# Patient Record
Sex: MALE | Race: WHITE | NOT HISPANIC OR LATINO | Employment: OTHER | URBAN - METROPOLITAN AREA
[De-identification: names, ages, dates, MRNs, and addresses within clinical notes are randomized per-mention and may not be internally consistent; named-entity substitution may affect disease eponyms.]

---

## 2019-04-21 ENCOUNTER — ANESTHESIA EVENT (OUTPATIENT)
Dept: SURGERY | Facility: MEDICAL CENTER | Age: 49
DRG: 872 | End: 2019-04-21
Payer: COMMERCIAL

## 2019-04-21 ENCOUNTER — HOSPITAL ENCOUNTER (INPATIENT)
Facility: MEDICAL CENTER | Age: 49
LOS: 2 days | DRG: 872 | End: 2019-04-23
Attending: EMERGENCY MEDICINE | Admitting: HOSPITALIST
Payer: COMMERCIAL

## 2019-04-21 ENCOUNTER — ANESTHESIA (OUTPATIENT)
Dept: SURGERY | Facility: MEDICAL CENTER | Age: 49
DRG: 872 | End: 2019-04-21
Payer: COMMERCIAL

## 2019-04-21 ENCOUNTER — APPOINTMENT (OUTPATIENT)
Dept: RADIOLOGY | Facility: MEDICAL CENTER | Age: 49
DRG: 872 | End: 2019-04-21
Attending: INTERNAL MEDICINE
Payer: COMMERCIAL

## 2019-04-21 DIAGNOSIS — L03.113 CELLULITIS OF RIGHT UPPER EXTREMITY: ICD-10-CM

## 2019-04-21 PROBLEM — L03.90 CELLULITIS: Status: ACTIVE | Noted: 2019-04-21

## 2019-04-21 PROBLEM — F10.10 ALCOHOL ABUSE: Status: ACTIVE | Noted: 2019-04-21

## 2019-04-21 PROBLEM — A41.9 SEPSIS (HCC): Status: ACTIVE | Noted: 2019-04-21

## 2019-04-21 PROBLEM — Z72.0 TOBACCO ABUSE: Status: ACTIVE | Noted: 2019-04-21

## 2019-04-21 LAB
ANION GAP SERPL CALC-SCNC: 7 MMOL/L (ref 0–11.9)
BASOPHILS # BLD AUTO: 0.3 % (ref 0–1.8)
BASOPHILS # BLD: 0.05 K/UL (ref 0–0.12)
BUN SERPL-MCNC: 13 MG/DL (ref 8–22)
CALCIUM SERPL-MCNC: 8.8 MG/DL (ref 8.5–10.5)
CHLORIDE SERPL-SCNC: 106 MMOL/L (ref 96–112)
CO2 SERPL-SCNC: 23 MMOL/L (ref 20–33)
CREAT SERPL-MCNC: 0.74 MG/DL (ref 0.5–1.4)
CRP SERPL HS-MCNC: 4.1 MG/L (ref 0–7.5)
EOSINOPHIL # BLD AUTO: 0.13 K/UL (ref 0–0.51)
EOSINOPHIL NFR BLD: 0.8 % (ref 0–6.9)
ERYTHROCYTE [DISTWIDTH] IN BLOOD BY AUTOMATED COUNT: 40.2 FL (ref 35.9–50)
GLUCOSE SERPL-MCNC: 112 MG/DL (ref 65–99)
GRAM STN SPEC: NORMAL
HCT VFR BLD AUTO: 45.6 % (ref 42–52)
HGB BLD-MCNC: 15.6 G/DL (ref 14–18)
IMM GRANULOCYTES # BLD AUTO: 0.06 K/UL (ref 0–0.11)
IMM GRANULOCYTES NFR BLD AUTO: 0.4 % (ref 0–0.9)
LACTATE BLD-SCNC: 1.2 MMOL/L (ref 0.5–2)
LACTATE BLD-SCNC: 1.4 MMOL/L (ref 0.5–2)
LYMPHOCYTES # BLD AUTO: 2.14 K/UL (ref 1–4.8)
LYMPHOCYTES NFR BLD: 13 % (ref 22–41)
MCH RBC QN AUTO: 29.9 PG (ref 27–33)
MCHC RBC AUTO-ENTMCNC: 34.2 G/DL (ref 33.7–35.3)
MCV RBC AUTO: 87.5 FL (ref 81.4–97.8)
MONOCYTES # BLD AUTO: 0.89 K/UL (ref 0–0.85)
MONOCYTES NFR BLD AUTO: 5.4 % (ref 0–13.4)
NEUTROPHILS # BLD AUTO: 13.13 K/UL (ref 1.82–7.42)
NEUTROPHILS NFR BLD: 80.1 % (ref 44–72)
NRBC # BLD AUTO: 0 K/UL
NRBC BLD-RTO: 0 /100 WBC
PLATELET # BLD AUTO: 224 K/UL (ref 164–446)
PMV BLD AUTO: 10.5 FL (ref 9–12.9)
POTASSIUM SERPL-SCNC: 3.8 MMOL/L (ref 3.6–5.5)
PROCALCITONIN SERPL-MCNC: <0.05 NG/ML
RBC # BLD AUTO: 5.21 M/UL (ref 4.7–6.1)
SIGNIFICANT IND 70042: NORMAL
SITE SITE: NORMAL
SODIUM SERPL-SCNC: 136 MMOL/L (ref 135–145)
SOURCE SOURCE: NORMAL
WBC # BLD AUTO: 16.4 K/UL (ref 4.8–10.8)

## 2019-04-21 PROCEDURE — 87070 CULTURE OTHR SPECIMN AEROBIC: CPT

## 2019-04-21 PROCEDURE — 96365 THER/PROPH/DIAG IV INF INIT: CPT

## 2019-04-21 PROCEDURE — 87040 BLOOD CULTURE FOR BACTERIA: CPT

## 2019-04-21 PROCEDURE — 96367 TX/PROPH/DG ADDL SEQ IV INF: CPT

## 2019-04-21 PROCEDURE — 87186 SC STD MICRODIL/AGAR DIL: CPT

## 2019-04-21 PROCEDURE — 160027 HCHG SURGERY MINUTES - 1ST 30 MINS LEVEL 2: Performed by: ORTHOPAEDIC SURGERY

## 2019-04-21 PROCEDURE — 84145 PROCALCITONIN (PCT): CPT

## 2019-04-21 PROCEDURE — 73201 CT UPPER EXTREMITY W/DYE: CPT | Mod: RT

## 2019-04-21 PROCEDURE — 80048 BASIC METABOLIC PNL TOTAL CA: CPT

## 2019-04-21 PROCEDURE — 700105 HCHG RX REV CODE 258: Performed by: ANESTHESIOLOGY

## 2019-04-21 PROCEDURE — 500891 HCHG PACK, ORTHO MAJOR: Performed by: ORTHOPAEDIC SURGERY

## 2019-04-21 PROCEDURE — 700101 HCHG RX REV CODE 250: Performed by: ANESTHESIOLOGY

## 2019-04-21 PROCEDURE — 700117 HCHG RX CONTRAST REV CODE 255: Performed by: INTERNAL MEDICINE

## 2019-04-21 PROCEDURE — 87205 SMEAR GRAM STAIN: CPT

## 2019-04-21 PROCEDURE — 770006 HCHG ROOM/CARE - MED/SURG/GYN SEMI*

## 2019-04-21 PROCEDURE — 87077 CULTURE AEROBIC IDENTIFY: CPT

## 2019-04-21 PROCEDURE — 700105 HCHG RX REV CODE 258: Performed by: HOSPITALIST

## 2019-04-21 PROCEDURE — 700111 HCHG RX REV CODE 636 W/ 250 OVERRIDE (IP): Performed by: INTERNAL MEDICINE

## 2019-04-21 PROCEDURE — 700111 HCHG RX REV CODE 636 W/ 250 OVERRIDE (IP): Performed by: ANESTHESIOLOGY

## 2019-04-21 PROCEDURE — 700105 HCHG RX REV CODE 258: Performed by: INTERNAL MEDICINE

## 2019-04-21 PROCEDURE — 86141 C-REACTIVE PROTEIN HS: CPT

## 2019-04-21 PROCEDURE — 87075 CULTR BACTERIA EXCEPT BLOOD: CPT

## 2019-04-21 PROCEDURE — 99220 PR INITIAL OBSERVATION CARE,LEVL III: CPT | Performed by: HOSPITALIST

## 2019-04-21 PROCEDURE — 700111 HCHG RX REV CODE 636 W/ 250 OVERRIDE (IP): Performed by: HOSPITALIST

## 2019-04-21 PROCEDURE — 96375 TX/PRO/DX INJ NEW DRUG ADDON: CPT

## 2019-04-21 PROCEDURE — 501838 HCHG SUTURE GENERAL: Performed by: ORTHOPAEDIC SURGERY

## 2019-04-21 PROCEDURE — 0H9FXZZ DRAINAGE OF RIGHT HAND SKIN, EXTERNAL APPROACH: ICD-10-PCS | Performed by: ORTHOPAEDIC SURGERY

## 2019-04-21 PROCEDURE — A9270 NON-COVERED ITEM OR SERVICE: HCPCS | Performed by: HOSPITALIST

## 2019-04-21 PROCEDURE — 83605 ASSAY OF LACTIC ACID: CPT

## 2019-04-21 PROCEDURE — 160048 HCHG OR STATISTICAL LEVEL 1-5: Performed by: ORTHOPAEDIC SURGERY

## 2019-04-21 PROCEDURE — 160036 HCHG PACU - EA ADDL 30 MINS PHASE I: Performed by: ORTHOPAEDIC SURGERY

## 2019-04-21 PROCEDURE — 96368 THER/DIAG CONCURRENT INF: CPT

## 2019-04-21 PROCEDURE — 700111 HCHG RX REV CODE 636 W/ 250 OVERRIDE (IP): Performed by: EMERGENCY MEDICINE

## 2019-04-21 PROCEDURE — 99285 EMERGENCY DEPT VISIT HI MDM: CPT

## 2019-04-21 PROCEDURE — 160035 HCHG PACU - 1ST 60 MINS PHASE I: Performed by: ORTHOPAEDIC SURGERY

## 2019-04-21 PROCEDURE — 700101 HCHG RX REV CODE 250: Performed by: EMERGENCY MEDICINE

## 2019-04-21 PROCEDURE — 96366 THER/PROPH/DIAG IV INF ADDON: CPT

## 2019-04-21 PROCEDURE — 700102 HCHG RX REV CODE 250 W/ 637 OVERRIDE(OP): Performed by: HOSPITALIST

## 2019-04-21 PROCEDURE — 160009 HCHG ANES TIME/MIN: Performed by: ORTHOPAEDIC SURGERY

## 2019-04-21 PROCEDURE — 160002 HCHG RECOVERY MINUTES (STAT): Performed by: ORTHOPAEDIC SURGERY

## 2019-04-21 PROCEDURE — 85025 COMPLETE CBC W/AUTO DIFF WBC: CPT

## 2019-04-21 PROCEDURE — 36415 COLL VENOUS BLD VENIPUNCTURE: CPT

## 2019-04-21 PROCEDURE — 700105 HCHG RX REV CODE 258: Performed by: EMERGENCY MEDICINE

## 2019-04-21 RX ORDER — MEPERIDINE HYDROCHLORIDE 25 MG/ML
12.5 INJECTION INTRAMUSCULAR; INTRAVENOUS; SUBCUTANEOUS
Status: DISCONTINUED | OUTPATIENT
Start: 2019-04-21 | End: 2019-04-21 | Stop reason: HOSPADM

## 2019-04-21 RX ORDER — SODIUM CHLORIDE 9 MG/ML
INJECTION, SOLUTION INTRAVENOUS CONTINUOUS
Status: DISCONTINUED | OUTPATIENT
Start: 2019-04-21 | End: 2019-04-22

## 2019-04-21 RX ORDER — DIPHENHYDRAMINE HYDROCHLORIDE 50 MG/ML
12.5 INJECTION INTRAMUSCULAR; INTRAVENOUS
Status: DISCONTINUED | OUTPATIENT
Start: 2019-04-21 | End: 2019-04-21 | Stop reason: HOSPADM

## 2019-04-21 RX ORDER — SODIUM CHLORIDE, SODIUM LACTATE, POTASSIUM CHLORIDE, CALCIUM CHLORIDE 600; 310; 30; 20 MG/100ML; MG/100ML; MG/100ML; MG/100ML
INJECTION, SOLUTION INTRAVENOUS
Status: DISCONTINUED | OUTPATIENT
Start: 2019-04-21 | End: 2019-04-21 | Stop reason: SURG

## 2019-04-21 RX ORDER — OXYCODONE HYDROCHLORIDE 5 MG/1
5 TABLET ORAL
Status: DISCONTINUED | OUTPATIENT
Start: 2019-04-21 | End: 2019-04-23 | Stop reason: HOSPADM

## 2019-04-21 RX ORDER — SODIUM CHLORIDE 9 MG/ML
500 INJECTION, SOLUTION INTRAVENOUS
Status: DISCONTINUED | OUTPATIENT
Start: 2019-04-21 | End: 2019-04-23 | Stop reason: HOSPADM

## 2019-04-21 RX ORDER — CLINDAMYCIN PHOSPHATE 600 MG/50ML
600 INJECTION, SOLUTION INTRAVENOUS ONCE
Status: COMPLETED | OUTPATIENT
Start: 2019-04-21 | End: 2019-04-21

## 2019-04-21 RX ORDER — HEPARIN SODIUM 5000 [USP'U]/ML
5000 INJECTION, SOLUTION INTRAVENOUS; SUBCUTANEOUS EVERY 8 HOURS
Status: DISCONTINUED | OUTPATIENT
Start: 2019-04-21 | End: 2019-04-21

## 2019-04-21 RX ORDER — DEXAMETHASONE SODIUM PHOSPHATE 4 MG/ML
INJECTION, SOLUTION INTRA-ARTICULAR; INTRALESIONAL; INTRAMUSCULAR; INTRAVENOUS; SOFT TISSUE PRN
Status: DISCONTINUED | OUTPATIENT
Start: 2019-04-21 | End: 2019-04-21 | Stop reason: SURG

## 2019-04-21 RX ORDER — HYDROMORPHONE HYDROCHLORIDE 1 MG/ML
0.2 INJECTION, SOLUTION INTRAMUSCULAR; INTRAVENOUS; SUBCUTANEOUS
Status: DISCONTINUED | OUTPATIENT
Start: 2019-04-21 | End: 2019-04-21 | Stop reason: HOSPADM

## 2019-04-21 RX ORDER — OXYCODONE HCL 5 MG/5 ML
10 SOLUTION, ORAL ORAL
Status: DISCONTINUED | OUTPATIENT
Start: 2019-04-21 | End: 2019-04-21 | Stop reason: HOSPADM

## 2019-04-21 RX ORDER — POLYETHYLENE GLYCOL 3350 17 G/17G
1 POWDER, FOR SOLUTION ORAL
Status: DISCONTINUED | OUTPATIENT
Start: 2019-04-21 | End: 2019-04-23 | Stop reason: HOSPADM

## 2019-04-21 RX ORDER — HALOPERIDOL 5 MG/ML
1 INJECTION INTRAMUSCULAR
Status: DISCONTINUED | OUTPATIENT
Start: 2019-04-21 | End: 2019-04-21 | Stop reason: HOSPADM

## 2019-04-21 RX ORDER — HYDROMORPHONE HYDROCHLORIDE 1 MG/ML
0.4 INJECTION, SOLUTION INTRAMUSCULAR; INTRAVENOUS; SUBCUTANEOUS
Status: DISCONTINUED | OUTPATIENT
Start: 2019-04-21 | End: 2019-04-21 | Stop reason: HOSPADM

## 2019-04-21 RX ORDER — DEXMEDETOMIDINE HYDROCHLORIDE 100 UG/ML
INJECTION, SOLUTION INTRAVENOUS PRN
Status: DISCONTINUED | OUTPATIENT
Start: 2019-04-21 | End: 2019-04-21 | Stop reason: SURG

## 2019-04-21 RX ORDER — METOPROLOL TARTRATE 1 MG/ML
1 INJECTION, SOLUTION INTRAVENOUS
Status: DISCONTINUED | OUTPATIENT
Start: 2019-04-21 | End: 2019-04-21 | Stop reason: HOSPADM

## 2019-04-21 RX ORDER — ONDANSETRON 2 MG/ML
4 INJECTION INTRAMUSCULAR; INTRAVENOUS
Status: COMPLETED | OUTPATIENT
Start: 2019-04-21 | End: 2019-04-21

## 2019-04-21 RX ORDER — HYDROMORPHONE HYDROCHLORIDE 1 MG/ML
0.1 INJECTION, SOLUTION INTRAMUSCULAR; INTRAVENOUS; SUBCUTANEOUS
Status: DISCONTINUED | OUTPATIENT
Start: 2019-04-21 | End: 2019-04-21 | Stop reason: HOSPADM

## 2019-04-21 RX ORDER — MORPHINE SULFATE 4 MG/ML
4 INJECTION, SOLUTION INTRAMUSCULAR; INTRAVENOUS
Status: DISCONTINUED | OUTPATIENT
Start: 2019-04-21 | End: 2019-04-23 | Stop reason: HOSPADM

## 2019-04-21 RX ORDER — OXYCODONE HCL 5 MG/5 ML
5 SOLUTION, ORAL ORAL
Status: DISCONTINUED | OUTPATIENT
Start: 2019-04-21 | End: 2019-04-21 | Stop reason: HOSPADM

## 2019-04-21 RX ORDER — SODIUM CHLORIDE 9 MG/ML
1000 INJECTION, SOLUTION INTRAVENOUS ONCE
Status: COMPLETED | OUTPATIENT
Start: 2019-04-21 | End: 2019-04-21

## 2019-04-21 RX ORDER — OXYCODONE HYDROCHLORIDE 10 MG/1
10 TABLET ORAL
Status: DISCONTINUED | OUTPATIENT
Start: 2019-04-21 | End: 2019-04-23 | Stop reason: HOSPADM

## 2019-04-21 RX ORDER — IBUPROFEN 200 MG
200-400 TABLET ORAL 2 TIMES DAILY PRN
COMMUNITY

## 2019-04-21 RX ORDER — AMOXICILLIN 250 MG
2 CAPSULE ORAL 2 TIMES DAILY
Status: DISCONTINUED | OUTPATIENT
Start: 2019-04-21 | End: 2019-04-23 | Stop reason: HOSPADM

## 2019-04-21 RX ORDER — SODIUM CHLORIDE, SODIUM LACTATE, POTASSIUM CHLORIDE, CALCIUM CHLORIDE 600; 310; 30; 20 MG/100ML; MG/100ML; MG/100ML; MG/100ML
INJECTION, SOLUTION INTRAVENOUS CONTINUOUS
Status: DISCONTINUED | OUTPATIENT
Start: 2019-04-21 | End: 2019-04-21 | Stop reason: HOSPADM

## 2019-04-21 RX ORDER — BISACODYL 10 MG
10 SUPPOSITORY, RECTAL RECTAL
Status: DISCONTINUED | OUTPATIENT
Start: 2019-04-21 | End: 2019-04-23 | Stop reason: HOSPADM

## 2019-04-21 RX ORDER — HYDRALAZINE HYDROCHLORIDE 20 MG/ML
5 INJECTION INTRAMUSCULAR; INTRAVENOUS
Status: DISCONTINUED | OUTPATIENT
Start: 2019-04-21 | End: 2019-04-21 | Stop reason: HOSPADM

## 2019-04-21 RX ORDER — ONDANSETRON 2 MG/ML
INJECTION INTRAMUSCULAR; INTRAVENOUS PRN
Status: DISCONTINUED | OUTPATIENT
Start: 2019-04-21 | End: 2019-04-21 | Stop reason: SURG

## 2019-04-21 RX ADMIN — IOHEXOL 100 ML: 350 INJECTION, SOLUTION INTRAVENOUS at 12:30

## 2019-04-21 RX ADMIN — OXYCODONE HYDROCHLORIDE 5 MG: 5 TABLET ORAL at 17:16

## 2019-04-21 RX ADMIN — FENTANYL CITRATE 50 MCG: 50 INJECTION, SOLUTION INTRAMUSCULAR; INTRAVENOUS at 14:07

## 2019-04-21 RX ADMIN — SODIUM CHLORIDE: 9 INJECTION, SOLUTION INTRAVENOUS at 17:10

## 2019-04-21 RX ADMIN — DEXAMETHASONE SODIUM PHOSPHATE 8 MG: 4 INJECTION, SOLUTION INTRA-ARTICULAR; INTRALESIONAL; INTRAMUSCULAR; INTRAVENOUS; SOFT TISSUE at 13:59

## 2019-04-21 RX ADMIN — CLINDAMYCIN IN 5 PERCENT DEXTROSE 600 MG: 12 INJECTION, SOLUTION INTRAVENOUS at 05:04

## 2019-04-21 RX ADMIN — PROPOFOL 200 MG: 10 INJECTION, EMULSION INTRAVENOUS at 13:54

## 2019-04-21 RX ADMIN — AMPICILLIN SODIUM AND SULBACTAM SODIUM 3 G: 2; 1 INJECTION, POWDER, FOR SOLUTION INTRAMUSCULAR; INTRAVENOUS at 23:53

## 2019-04-21 RX ADMIN — SODIUM CHLORIDE, POTASSIUM CHLORIDE, SODIUM LACTATE AND CALCIUM CHLORIDE: 600; 310; 30; 20 INJECTION, SOLUTION INTRAVENOUS at 15:45

## 2019-04-21 RX ADMIN — VANCOMYCIN HYDROCHLORIDE 2100 MG: 100 INJECTION, POWDER, LYOPHILIZED, FOR SOLUTION INTRAVENOUS at 06:15

## 2019-04-21 RX ADMIN — AMPICILLIN SODIUM AND SULBACTAM SODIUM 3 G: 2; 1 INJECTION, POWDER, FOR SOLUTION INTRAMUSCULAR; INTRAVENOUS at 17:16

## 2019-04-21 RX ADMIN — VANCOMYCIN HYDROCHLORIDE 1000 MG: 100 INJECTION, POWDER, LYOPHILIZED, FOR SOLUTION INTRAVENOUS at 18:41

## 2019-04-21 RX ADMIN — LIDOCAINE HYDROCHLORIDE 80 MG: 20 INJECTION, SOLUTION INFILTRATION; PERINEURAL at 13:54

## 2019-04-21 RX ADMIN — OXYCODONE HYDROCHLORIDE 5 MG: 5 TABLET ORAL at 08:31

## 2019-04-21 RX ADMIN — FENTANYL CITRATE 50 MCG: 50 INJECTION, SOLUTION INTRAMUSCULAR; INTRAVENOUS at 14:01

## 2019-04-21 RX ADMIN — ONDANSETRON 4 MG: 2 INJECTION INTRAMUSCULAR; INTRAVENOUS at 14:06

## 2019-04-21 RX ADMIN — FENTANYL CITRATE 100 MCG: 50 INJECTION, SOLUTION INTRAMUSCULAR; INTRAVENOUS at 05:37

## 2019-04-21 RX ADMIN — ONDANSETRON 4 MG: 2 INJECTION INTRAMUSCULAR; INTRAVENOUS at 15:33

## 2019-04-21 RX ADMIN — SODIUM CHLORIDE: 9 INJECTION, SOLUTION INTRAVENOUS at 06:38

## 2019-04-21 RX ADMIN — SODIUM CHLORIDE, POTASSIUM CHLORIDE, SODIUM LACTATE AND CALCIUM CHLORIDE: 600; 310; 30; 20 INJECTION, SOLUTION INTRAVENOUS at 13:47

## 2019-04-21 RX ADMIN — SODIUM CHLORIDE 1000 ML: 9 INJECTION, SOLUTION INTRAVENOUS at 05:38

## 2019-04-21 RX ADMIN — AMPICILLIN SODIUM AND SULBACTAM SODIUM 3 G: 2; 1 INJECTION, POWDER, FOR SOLUTION INTRAMUSCULAR; INTRAVENOUS at 05:56

## 2019-04-21 RX ADMIN — DEXMEDETOMIDINE HYDROCHLORIDE 40 MCG: 100 INJECTION, SOLUTION INTRAVENOUS at 14:02

## 2019-04-21 ASSESSMENT — ENCOUNTER SYMPTOMS
SPEECH CHANGE: 0
EYE DISCHARGE: 0
ABDOMINAL PAIN: 0
CHILLS: 1
PALPITATIONS: 0
NAUSEA: 0
DEPRESSION: 0
SHORTNESS OF BREATH: 0
FOCAL WEAKNESS: 0
BLURRED VISION: 0
DIZZINESS: 0
MYALGIAS: 0
DOUBLE VISION: 0
FLANK PAIN: 0
VOMITING: 0
SENSORY CHANGE: 0
HEARTBURN: 0
FEVER: 0
BRUISES/BLEEDS EASILY: 0
COUGH: 0
HALLUCINATIONS: 0
HEMOPTYSIS: 0
TINGLING: 1
WEAKNESS: 1

## 2019-04-21 ASSESSMENT — LIFESTYLE VARIABLES
HOW MANY TIMES IN THE PAST YEAR HAVE YOU HAD 5 OR MORE DRINKS IN A DAY: 0
ON A TYPICAL DAY WHEN YOU DRINK ALCOHOL HOW MANY DRINKS DO YOU HAVE: 0
TOTAL SCORE: 0
TOTAL SCORE: 0
ALCOHOL_USE: YES
TOTAL SCORE: 0
EVER HAD A DRINK FIRST THING IN THE MORNING TO STEADY YOUR NERVES TO GET RID OF A HANGOVER: NO
CONSUMPTION TOTAL: NEGATIVE
AVERAGE NUMBER OF DAYS PER WEEK YOU HAVE A DRINK CONTAINING ALCOHOL: 0
EVER FELT BAD OR GUILTY ABOUT YOUR DRINKING: NO
SUBSTANCE_ABUSE: 0
HAVE PEOPLE ANNOYED YOU BY CRITICIZING YOUR DRINKING: NO
HAVE YOU EVER FELT YOU SHOULD CUT DOWN ON YOUR DRINKING: NO
EVER_SMOKED: YES

## 2019-04-21 ASSESSMENT — PATIENT HEALTH QUESTIONNAIRE - PHQ9
SUM OF ALL RESPONSES TO PHQ9 QUESTIONS 1 AND 2: 0
1. LITTLE INTEREST OR PLEASURE IN DOING THINGS: NOT AT ALL
2. FEELING DOWN, DEPRESSED, IRRITABLE, OR HOPELESS: NOT AT ALL

## 2019-04-21 ASSESSMENT — PAIN SCALES - GENERAL: PAIN_LEVEL: 6

## 2019-04-21 NOTE — PROGRESS NOTES
Pt was in OR. Chart reviewed. Cellulitis of hand after workplace injury, concern for abscess, going to the OR for I&D today.    Continue IV vanc and Unasyn for now. Will follow OP note and cultures    Full consult note to follow

## 2019-04-21 NOTE — H&P
Hospital Medicine History & Physical Note    Date of Service  4/21/2019    Primary Care Physician  No primary care provider on file.    Consultants  Ortho/hand    Code Status  full    Chief Complaint  Hand pain and swelling    History of Presenting Illness  49 y.o. male who presented 4/21/2019 with pmx of tobacco abuse presents with right arm pain and swelling.  This patient was actually working in his basement on a water cooler and got cut on his hand 2 days ago.  He assumed that he probably got bit by an insect earlier tonight.  And developed right arm swelling pain erythema.  No known alleviating or exacerbating factors to his symptoms.  Significant pain 10 out of 10.  No fever no chills no sweats.  Streaking erythema up into his arm.  He will be admitted to the hospital with rapidly progressing cellulitis.    Review of Systems  Review of Systems   Constitutional: Positive for chills. Negative for fever.   HENT: Negative for congestion, hearing loss and tinnitus.    Eyes: Negative for blurred vision, double vision and discharge.   Respiratory: Negative for cough, hemoptysis and shortness of breath.    Cardiovascular: Negative for chest pain, palpitations and leg swelling.   Gastrointestinal: Negative for abdominal pain, heartburn, nausea and vomiting.   Genitourinary: Negative for dysuria and flank pain.   Musculoskeletal: Negative for joint pain and myalgias.   Skin: Positive for rash.   Neurological: Positive for weakness. Negative for dizziness, sensory change, speech change and focal weakness.   Endo/Heme/Allergies: Negative for environmental allergies. Does not bruise/bleed easily.   Psychiatric/Behavioral: Negative for depression, hallucinations and substance abuse.       Past Medical History  Reviewed and not pertinent    Surgical History  Reviewed and not pertinent     Family History  Reviewed and not pertinent     Social History   reports that he has been smoking Cigarettes.  He has been smoking about  0.33 packs per day. He has never used smokeless tobacco. He reports that he drinks alcohol. He reports that he does not use drugs.    Allergies  No Known Allergies    Medications  None       Physical Exam  Temp:  [36.8 °C (98.3 °F)] 36.8 °C (98.3 °F)  Pulse:  [104] 104  Resp:  [16] 16  BP: (153)/(99) 153/99  SpO2:  [98 %] 98 %    Physical Exam   Constitutional: He is oriented to person, place, and time. He appears well-developed and well-nourished.   HENT:   Head: Normocephalic and atraumatic.   Eyes: Pupils are equal, round, and reactive to light. Conjunctivae and EOM are normal.   Neck: Normal range of motion. Neck supple. No JVD present.   Cardiovascular: Normal rate, regular rhythm, normal heart sounds and intact distal pulses.    No murmur heard.  Pulmonary/Chest: Effort normal and breath sounds normal. No respiratory distress. He exhibits no tenderness.   Abdominal: Soft. Bowel sounds are normal. He exhibits no distension. There is no tenderness.   Musculoskeletal: Normal range of motion. He exhibits edema.   Right hand erythema and swelling, pain with passive rom and decreased sensation to the tips. Streaking erythema up the arm   Neurological: He is alert and oriented to person, place, and time. No cranial nerve deficit. He exhibits normal muscle tone.   Skin: Skin is warm and dry. There is erythema.   Psychiatric: He has a normal mood and affect. His behavior is normal. Judgment and thought content normal.   Nursing note and vitals reviewed.      Laboratory:  Recent Labs      04/21/19   0443   WBC  16.4*   RBC  5.21   HEMOGLOBIN  15.6   HEMATOCRIT  45.6   MCV  87.5   MCH  29.9   MCHC  34.2   RDW  40.2   PLATELETCT  224   MPV  10.5     Recent Labs      04/21/19   0443   SODIUM  136   POTASSIUM  3.8   CHLORIDE  106   CO2  23   GLUCOSE  112*   BUN  13   CREATININE  0.74   CALCIUM  8.8     Recent Labs      04/21/19   0443   GLUCOSE  112*                 No results for input(s): TROPONINI in the last 72  hours.    Urinalysis:    No results found     Imaging:  No orders to display         Assessment/Plan:  I anticipate this patient will require at least two midnights for appropriate medical management, necessitating inpatient admission.    * Cellulitis   Assessment & Plan    IV abx broad spectrum   Rapidly progressing and significant amount of pain out or proportion to exam   NPO for now, ortho has been consulted  Pain control   Descilate therapy as appropriate     Sepsis (HCC)   Assessment & Plan    This is sepsis (without associated acute organ dysfunction).   Tachycardia, leukocytosis  Cellulitis source  IV abx   Follow cultures  Trend lactic   descilate therapy as appropriate     Alcohol abuse   Assessment & Plan    Encouraged cessation      Tobacco abuse   Assessment & Plan    Greater than 10 minutes spent with patient smoking cessation counseling, discussed cardiovascular risk factors of smoking. Nicotine patch provided         VTE prophylaxis: scd

## 2019-04-21 NOTE — ED PROVIDER NOTES
ED Provider Note    Scribed for Elsa Griffith M.D. by Lopez Scott. 4/21/2019, 4:49 AM.    Primary care provider: PCP, none noted.  Means of arrival: Walk in  History obtained from: Patient  History limited by: None    CHIEF COMPLAINT  Chief Complaint   Patient presents with   • Bug Bite     Approximately 4 hours ago, the patient noticed his right hand was hurting, he thought he was bit by a mosquito. Pt now reports his pain is a 9/10, startin in his right hand and moving up his arm. Pt also reports his fingers have also begun to get cold, and numb over the past hour.        PRADEEP Shaver is a 49 y.o. male who presents to the Emergency Department for evaluation of right hand pain onset several hours ago. The patient states that he thought he got an insect bite to the right hand about 4 hours ago. He assumed it was a mosquito, but did not see the insect. The patient then noted swelling that has begun to extend up his right arm with red streaks moving up his arm. No exacerbating or alleviating factors. The patient endorses tingling of the right thumb and index finger, but denies any fevers. He denies any history of abscesses or any allergies to antibiotics.    REVIEW OF SYSTEMS  Review of Systems   Constitutional: Negative for fever.   Musculoskeletal:        Arm pain and swelling   Neurological: Positive for tingling.   All other systems reviewed and are negative.    PAST MEDICAL HISTORY  No history of abscess.    SURGICAL HISTORY  patient denies any surgical history    SOCIAL HISTORY  Social History   Substance Use Topics   • Smoking status: Current Every Day Smoker     Packs/day: 0.33     Types: Cigarettes   • Smokeless tobacco: Never Used   • Alcohol use Yes      Comment: 1 beer daily      History   Drug Use No       FAMILY HISTORY  History reviewed. No pertinent family history.    CURRENT MEDICATIONS  Home Medications     Reviewed by Sebas Washburn R.N. (Registered Nurse) on 04/21/19 at 6193   "Med List Status: Partial   Medication Last Dose Status        Patient Jean Taking any Medications                       ALLERGIES  No Known Allergies    PHYSICAL EXAM  VITAL SIGNS: /99   Pulse (!) 104   Temp 36.8 °C (98.3 °F) (Temporal)   Resp 16   Ht 1.803 m (5' 11\")   Wt 84.1 kg (185 lb 6.5 oz)   SpO2 98%   BMI 25.86 kg/m²   Vitals reviewed by myself.  Physical Exam  Nursing note and vitals reviewed.  Constitutional: Well-developed and well-nourished. In mild distress  HENT: Head is normocephalic and atraumatic.  Eyes: extra-ocular movements intact  Cardiovascular: Tachycardic rate and regular rhythm. No murmur heard. 2+ bilateral radial pulses  Pulmonary/Chest: Breath sounds normal. No wheezes or rales.   Abdominal: Soft and non-tender. No distention.    Musculoskeletal: Patient has significant erythema to the dorsal aspect of the hand below the right thumb and index finger extending into the right thumb and index fingers and up the rest.  Patient has associated streaking erythema up the right forearm.  Patient has pain with movement of the right thumb and index finger.  Sensation is intact in all distal fingertips.    Neurological: Awake and alert  Skin: Skin is warm and dry.     DIAGNOSTIC STUDIES /  LABS  Labs Reviewed   CBC WITH DIFFERENTIAL - Abnormal; Notable for the following:        Result Value    WBC 16.4 (*)     Neutrophils-Polys 80.10 (*)     Lymphocytes 13.00 (*)     Neutrophils (Absolute) 13.13 (*)     Monos (Absolute) 0.89 (*)     All other components within normal limits   BASIC METABOLIC PANEL - Abnormal; Notable for the following:     Glucose 112 (*)     All other components within normal limits   CRP HIGH SENSITIVE (CARDIAC)   LACTIC ACID   BLOOD CULTURE    Narrative:     Per Hospital Policy: Only change Specimen Src: to \"Line\" if  specified by physician order.   BLOOD CULTURE    Narrative:     Per Hospital Policy: Only change Specimen Src: to \"Line\" if  specified by physician " order.   ESTIMATED GFR   PROCALCITONIN   CULTURE RESPIRATORY W/ GRM STN   BLOOD CULTURE   BLOOD CULTURE   URINALYSIS   LACTIC ACID     All labs reviewed by me.    REASSESSMENT  4:55 AM Patient presents to the ED with right arm pain and swelling. I informed the patient that I will contact the hand specialist and he will need to undergo blood work for evaluation. The patient will need to be started on antibiotics for treatment.    5:26 AM Dr. Peña, Hospitalist, consulted and agrees to admit the patient.    5:30 AM I reevaluated the patient and he was doing well. I informed him that he will need to be admitted. He understands and agrees to plan of care.    5:32 AM Dr. Caban, Orthopedics, consulted and agrees to evaluate the patient while patient is admitted.    HYDRATION: Based on the patient's presentation of Sepsis and Tachycardia the patient was given IV fluids. IV Hydration was used because oral hydration was not as rapid as required. Upon recheck following hydration, the patient was imporved..        COURSE & MEDICAL DECISION MAKING  Nursing notes, VS, PMSFHx reviewed in chart.    Patient is a 49-year-old male who comes in for erythema to his right hand.  Given the rapid progression over the last 4 hours of his erythema with streaking up the right arm and tingling in his right thumb and index finger I am concerned about necrotizing fasciitis versus simple cellulitis.  I elect to obtain labs and blood cultures and start patient on IV clindamycin.      Patient's initial vitals notable for slight tachycardia, patient started on antibiotics and IV fluids after which tachycardia resolved.  His pain is managed with fentanyl after which he feels improved.  Labs returned are notable for leukocytosis of 16,000.  Lactic acid and CRP are within normal limits.  Given the rapid progression of symptoms I discussed the case with on-call hand surgeon Dr. Caban who advises to continue IV antibiotics and admit the patient, and  patient will be evaluated by hand surgery who will consult.  Patient is agreeable to this plan.  I discussed the case with Dr. Peña who has accepted the admission.  At time of admission patient is in guarded condition.      DISPOSITION:  Patient will be admitted to Dr. Peña, Hospitalist, in critical condition.      FINAL IMPRESSION  1. Cellulitis of right upper extremity          Lopez KAYE (Scribe), am scribing for, and in the presence of, Elsa Griffith M.D..    Electronically signed by: Lopez Scott (Scribe), 4/21/2019    IElsa M.D. personally performed the services described in this documentation, as scribed by Lopez Scott in my presence, and it is both accurate and complete. C    The note accurately reflects work and decisions made by me.  Elsa Griffith  4/21/2019  6:41 AM

## 2019-04-21 NOTE — ED TRIAGE NOTES
"Dio Sivakumar  49 y.o. male  Chief Complaint   Patient presents with   • Bug Bite     Approximately 4 hours ago, the patient noticed his right hand was hurting, he thought he was bit by a mosquito. Pt now reports his pain is a 9/10, startin in his right hand and moving up his arm. Pt also reports his fingers have also begun to get cold, and numb over the past hour.        Pt ambulated to triage with steady gait for above complaint.   Pt's right hand appears to have a bite venessa between his thumb and pointer finger, very swollen and red. Fingertips are cool to touch. Pt reports difficulty moving his fingers but is able to demonstrate movement.   Pt is alert and oriented, speaking in full sentences, follows commands and responds appropriately to questions. NAD. Resp are even and unlabored.     Pt returned to lobby, educated on triage process, and to inform staff of any changes or concerns.    Blood Pressure: 153/99, Pulse: (!) 104, Respiration: 16, Temperature: 36.8 °C (98.3 °F), Height: 180.3 cm (5' 11\"), Weight: 84.1 kg (185 lb 6.5 oz), BMI (Calculated): 25.86, BSA (Calculated): 2.1, Pulse Oximetry: 98 %, O2 Delivery: None (Room Air)    "

## 2019-04-21 NOTE — ASSESSMENT & PLAN NOTE
Greater than 10 minutes spent with patient smoking cessation counseling, discussed cardiovascular risk factors of smoking. Nicotine patch provided  Nicotine gum ordered per patient request.

## 2019-04-21 NOTE — CONSULTS
"4/21/2019    Dio Shaver is a 49 y.o. male who presents after a work inury with a right hand infection and is here for operative management. Patient denies numbness, parasthesias, loss of concousness or other trauma    History reviewed. No pertinent past medical history.    History reviewed. No pertinent surgical history.    Medications  No current facility-administered medications on file prior to encounter.      No current outpatient prescriptions on file prior to encounter.       Allergies  Patient has no known allergies.    ROS  Right hand infection. All other systems were reviewed and found to be negative    History reviewed. No pertinent family history.    Social History     Social History   • Marital status: Single     Spouse name: N/A   • Number of children: N/A   • Years of education: N/A     Social History Main Topics   • Smoking status: Current Every Day Smoker     Packs/day: 0.33     Types: Cigarettes   • Smokeless tobacco: Never Used   • Alcohol use Yes      Comment: 1 beer daily   • Drug use: No   • Sexual activity: Not on file     Other Topics Concern   • Not on file     Social History Narrative   • No narrative on file       Physical Exam  Vitals  /81   Pulse 92   Temp 36.4 °C (97.6 °F) (Temporal)   Resp 18   Ht 1.803 m (5' 11\")   Wt 84.1 kg (185 lb 6.5 oz)   SpO2 100%   General: Well Developed, Well Nourished, no acute distress  HEENT: Normocephalic, atraumatic  Eyes: Anicteric, PERRLA, EOMI  Neck: Supple, nontender, no masses  Lungs: CTA, no wheezes or crackles  Heart: RRR, no murmurs, rubs or gallops  Abdomen: Soft, NT, ND  Pelvis: Stable to AP and Lateral Compression  Skin: Intact, no open wounds  Extremities: Right hand pain and swelling  Neuro: M/R/U/A intact  Vascular: 2+rad/Uln, Capillary refill <2 seconds    Radiographs:  CT-HAND WITH RIGHT    (Results Pending)       Laboratory Values  Recent Labs      04/21/19   0443   WBC  16.4*   RBC  5.21   HEMOGLOBIN  15.6   HEMATOCRIT  " 45.6   MCV  87.5   MCH  29.9   MCHC  34.2   RDW  40.2   PLATELETCT  224   MPV  10.5     Recent Labs      04/21/19   0443   SODIUM  136   POTASSIUM  3.8   CHLORIDE  106   CO2  23   GLUCOSE  112*   BUN  13             Impression: Right hand dorsal abscess    Plan:Operative intervention. Risks and benefits of surgery were discussed which include but are not limited to bleeding, infection, neurovascular damage, malunion, nonunion, instability, limb length discrepancy, DVT, PE, MI, Stroke and death. They understand these risks and wish to proceed.

## 2019-04-21 NOTE — PROGRESS NOTES
"Pt transport here to take pt for surgery, pt now refusing surgery, states \"I work with my right hand and this could mess up my work.\" MD updated, will see pt  "

## 2019-04-21 NOTE — PROGRESS NOTES
Pt changed mind on surgery after speaking with MD. Taken down to pre-op by tech and this RN. Chart given to RN

## 2019-04-21 NOTE — ASSESSMENT & PLAN NOTE
This is sepsis (without associated acute organ dysfunction).   Tachycardia, leukocytosis  Cellulitis source  IV abx   Follow cultures  Trend lactic   descilate therapy as appropriate

## 2019-04-21 NOTE — OP REPORT
DATE OF SERVICE:  04/21/2019    PREOPERATIVE DIAGNOSIS:  Right dorsal hand abscess.    POSTOPERATIVE DIAGNOSIS:  Right dorsal hand abscess.    PROCEDURE PERFORMED:  Irrigation and debridement, right dorsal hand abscess.    SURGEON:  Jayson Morrison MD    ASSISTANT:  Abhay Hilliard PA-C    ESTIMATED BLOOD LOSS:  None.    INDICATIONS:  This is a gentleman status post a  injury to the hand   during which he sustained a dorsal hand abscess.  Risks and benefits of   irrigation and debridement were discussed, which include but not limited to   bleeding, infection, neurovascular damage, pain, stiffness, and need for   further surgery.  He understands all these risks and wished to proceed.    DESCRIPTION OF PROCEDURE:  The patient was sedated with LMA anesthesia and his   right hand was prepped and draped in the usual sterile fashion.  A 2 cm   incision was made over the dorsal abscess.  Purulent fluid was encountered.    This was sent for culture and sensitivity.  Wounds were irrigated, closed with   nylon suture.  Sterile dressings were applied.  The patient tolerated the   procedure well.    POSTOPERATIVE PLAN:  The patient will be weightbearing as tolerated and   admitted by the medicine service for IV antibiotics while waiting definitive   culture results.       ____________________________________     JAYSON MORRISON MD    SUAD / NTS    DD:  04/21/2019 14:07:35  DT:  04/21/2019 16:25:08    D#:  [id number]  Job#:  [id number]

## 2019-04-21 NOTE — PROGRESS NOTES
A/o respirations are even and unlabored on room air ,assessment completed, pt complaining of right hand pain, 8/10, vital signs stable,  IV fluids and antibiotics  running per orders, updated communication board,  poc discussed and understood, verbalized understanding, pt aware NPO , all questions answered at this time , fall precautions in place, call button within reach, will continue to monitor

## 2019-04-21 NOTE — PROGRESS NOTES
"Pharmacy Kinetics 49 y.o. male on vancomycin day # 1 2019    Vancomycin New Start    Indication for Treatment: SSTI    Pertinent history per medical record: Admitted on 2019 for what patient presumed to be a bug bit between his thumb and pointer finger on his right hand.  The patient developed worsening pain which began to move up his arm, he noted his fingers to be cold and numb.  His hand is noted to be swollen and erythematous.      Other antibiotics: ampicillin/sulbactam 3 grams iv q6h    Allergies: Patient has no known allergies.     List concerns for renal function: none    Pertinent cultures to date:   19 blood-peripheral x 2: in process    MRSA nares swab if pneumonia is a concer: n-a    Recent Labs      19   0443   WBC  16.4*   NEUTSPOLYS  80.10*     Recent Labs      19   0443   BUN  13   CREATININE  0.74     /99   Pulse (!) 104   Temp 36.8 °C (98.3 °F) (Temporal)   Resp 16   Ht 1.803 m (5' 11\")   Wt 84.1 kg (185 lb 6.5 oz)   SpO2 98%  Temp (24hrs), Av.8 °C (98.3 °F), Min:36.8 °C (98.3 °F), Max:36.8 °C (98.3 °F)      A/P   1. Vancomycin dose change: vancomycin 2100 mg iv x 1 followed by 1g iv q8h (0630,1430,2230)  2. Next vancomycin level:  at 1400  3. Goal trough: 12-16 mcg/mL  4. Assessment: Monitor clinical status to ensure patient's s/s are improving and there aren't concerns for necrotizing fasciitis or compartment syndrome.  No identifiable renal function concerns.  5. Plan:  Received a loading dose followed by 12 mg/kg iv q8h with a recommended follow up level prior to the 5th total dose.    Esperanza Gill, Pharm.D., BCPS        "

## 2019-04-21 NOTE — PROGRESS NOTES
48 y/o male admitted with right arm pain and swelling for 2 days after getting a cut on his hand.  Concern for abscess.  CT pending.  Ortho surgery has evaluated and is planning for surgical intervention.  ID has been consulted for antibiotics.  Continue vancomycin and unasyn for now.  Patient is currently afebrile with stable vital signs.  Continue pain control.

## 2019-04-21 NOTE — PROGRESS NOTES
Assumed pt care at 0700. Received report from Liana PALMER. A&O x4. Pt reports 6/10 pain to R hand at this time, medicated per MAR. Respirations even and unlabored on RA. Redness and swelling noted to R hand.  Updated on POC, communication board updated. Bed locked and in lowest position. Call light and belongings within reach. Non-skid socks in place. Needs met, will continue to monitor.

## 2019-04-21 NOTE — ANESTHESIA QCDR
2019 [facility] Clinical Data Registry (for Quality Improvement)     Postoperative nausea/vomiting risk protocol (Adult = 18 yrs and Pediatric 3-17 yrs)- (430 and 463)  General inhalation anesthetic (NOT TIVA) with PONV risk factors: No  Provision of anti-emetic therapy with at least 2 different classes of agents: N/A  Patient DID NOT receive anti-emetic therapy and reason is documented in Medical Record: N/A    Multimodal Pain Management- (AQI59)  Patient undergoing Elective Surgery (i.e. Outpatient, or ASC, or Prescheduled Surgery prior to Hospital Admission): No  Use of Multimodal Pain Management, two or more drugs and/or interventions, NOT including systemic opioids: N/A  Exception: Documented allergy to multiple classes of analgesics: N/A    PACU assessment of acute postoperative pain prior to Anesthesia Care End- Applies to Patients Age = 18- (ABG7)  Initial PACU pain score is which of the following: < 7/10  Patient unable to report pain score: N/A    Post-anesthetic transfer of care checklist/protocol to PACU/ICU- (426 and 427)  Upon conclusion of case, patient transferred to which of the following locations: PACU/Non-ICU  Use of transfer checklist/protocol: Yes  Exclusion: Service Performed in Patient Hospital Room (and thus did not require transfer): N/A    PACU Reintubation- (AQI31)  General anesthesia requiring endotracheal intubation (ETT) along with subsequent extubation in OR or PACU: No  Required reintubation in the PACU: N/A  Extubation was a planned trial documented in the medical record prior to removal of the original airway device: N/A    Unplanned admission to ICU related to anesthesia service up through end of PACU care- (MD51)  Unplanned admission to ICU (not initially anticipated at anesthesia start time): No

## 2019-04-21 NOTE — ANESTHESIA PREPROCEDURE EVALUATION
Relevant Problems   (+) Cellulitis of right hand   (+) Tobacco abuse       Physical Exam    Airway   Mallampati: II  TM distance: >3 FB  Neck ROM: full       Cardiovascular - normal exam  Rhythm: regular  Rate: normal  (-) murmur     Dental - normal exam         Pulmonary - normal exam  Breath sounds clear to auscultation     Abdominal    Neurological - normal exam                 Anesthesia Plan    ASA 2 - emergent   ASA physical status emergent criteria: other (comment)    Plan - general       Airway plan will be LMA  (Acute celluitis)      Induction: intravenous    Postoperative Plan: Postoperative administration of opioids is intended.    Pertinent diagnostic labs and testing reviewed    Informed Consent:    Anesthetic plan and risks discussed with patient.    Use of blood products discussed with: patient whom consented to blood products.

## 2019-04-21 NOTE — PROGRESS NOTES
2 RN skin checked. Swollen, redness but palpable pulse pictures taken and uploaded. Elevated left hand. Antibiotics still running.

## 2019-04-21 NOTE — PROGRESS NOTES
"RHD 49 M box knife cut 3 d ago presents with abscess dorsum of RH between MC1&2  NPO since yesterday  WBC 16+  Vancomycin started by med service  /81   Pulse 92   Temp 36.4 °C (97.6 °F) (Temporal)   Resp 18   Ht 1.803 m (5' 11\")   Wt 84.1 kg (185 lb 6.5 oz)   SpO2 100%   BMI 25.86 kg/m²   Recent Labs      04/21/19   0443   WBC  16.4*   RBC  5.21   HEMOGLOBIN  15.6   HEMATOCRIT  45.6   MCV  87.5   MCH  29.9   RDW  40.2   PLATELETCT  224   MPV  10.5   NEUTSPOLYS  80.10*   LYMPHOCYTES  13.00*   MONOCYTES  5.40   EOSINOPHILS  0.80   BASOPHILS  0.30   Skin with 1 cm incision as above, surrounding fluctuance and erythema. Patient clearly fires forearm flexors, forearm extensors, and moves all five fingers without issue . Sensation is intact to light touch throughout median, ulnar, and radial nerve distributions. Strong and palpable radial pulses with capillary refill less than 2 seconds.     A/ Abcess   P/ OR today Dr. Morrison I+D  Keep NPO      "

## 2019-04-21 NOTE — ANESTHESIA TIME REPORT
Anesthesia Start and Stop Event Times     Date Time Event    4/21/2019 1347 Anesthesia Start     1418 Anesthesia Stop        Responsible Staff  04/21/19    Name Role Begin End    Tonia Dowell M.D. Anesth 1347 1418        Preop Diagnosis (Free Text):  Pre-op Diagnosis             Preop Diagnosis (Codes):  Diagnosis Information     Diagnosis Code(s):         Post op Diagnosis  Hand injury      Premium Reason  B. 1st Call    Comments:

## 2019-04-21 NOTE — ANESTHESIA POSTPROCEDURE EVALUATION
Patient: Dio Shaver    Procedure Summary     Date:  04/21/19 Room / Location:  [place] [address] / SURGERY [facility]    Anesthesia Start:  1347 Anesthesia Stop:  1418    Procedure:  IRRIGATION AND DEBRIDEMENT, WOUND (Right Hand) Diagnosis:  (Right hand dorsal abscess)    Surgeon:  Jayson Morrison M.D. Responsible Provider:  Tonia Dowell M.D.    Anesthesia Type:  general ASA Status:  2 - Emergent          Final Anesthesia Type: general  Last vitals  BP   Blood Pressure: 132/76, NIBP: 124/85    Temp   37.4 °C (99.3 °F)    Pulse   Pulse: 96, Heart Rate (Monitored): 100   Resp   15    SpO2   100 %      Anesthesia Post Evaluation    Patient location during evaluation: PACU  Patient participation: complete - patient participated  Level of consciousness: awake and alert  Pain score: 6    Airway patency: patent  Anesthetic complications: no  Cardiovascular status: hemodynamically stable  Respiratory status: acceptable  Hydration status: euvolemic    PONV: none           Nurse Pain Score: 10 (NPRS)

## 2019-04-21 NOTE — PROGRESS NOTES
Pt to transfer to [facility]. Alverto PALMER contacted for report, report already received from PACU RN. All pt belongings transferred to room at this time.

## 2019-04-21 NOTE — ANESTHESIA PROCEDURE NOTES
Airway  Date/Time: 4/21/2019 1:56 PM  Performed by: TRANG KHALIL  Authorized by: TRANG KHALIL     Location:  OR  Urgency:  Elective  Indications for Airway Management:  Anesthesia  Spontaneous Ventilation: absent    Sedation Level:  Deep  Preoxygenated: Yes    Mask Difficulty Assessment:  0 - not attempted  Final Airway Type:  Supraglottic airway  Final Supraglottic Airway:  Standard LMA  SGA Size:  4  Number of Attempts at Approach:  1

## 2019-04-21 NOTE — ASSESSMENT & PLAN NOTE
IV abx broad spectrum   Rapidly progressing and significant amount of pain out or proportion to exam   Pain control   Descilate therapy as appropriate  4/21:  S/p ORIF Dr. Morrison did wash out, stated purulent abscess seen.  4/22:  TdaP ordered.  lovenox post op.  Improvement clinically with IV unasyn and IV vancomycin pending OR cultures. 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

## 2019-04-22 LAB
ALBUMIN SERPL BCP-MCNC: 3.5 G/DL (ref 3.2–4.9)
ALBUMIN/GLOB SERPL: 1.3 G/DL
ALP SERPL-CCNC: 69 U/L (ref 30–99)
ALT SERPL-CCNC: 12 U/L (ref 2–50)
ANION GAP SERPL CALC-SCNC: 7 MMOL/L (ref 0–11.9)
AST SERPL-CCNC: 13 U/L (ref 12–45)
BASOPHILS # BLD AUTO: 0.1 % (ref 0–1.8)
BASOPHILS # BLD: 0.02 K/UL (ref 0–0.12)
BILIRUB SERPL-MCNC: 0.9 MG/DL (ref 0.1–1.5)
BUN SERPL-MCNC: 13 MG/DL (ref 8–22)
CALCIUM SERPL-MCNC: 8.7 MG/DL (ref 8.5–10.5)
CHLORIDE SERPL-SCNC: 102 MMOL/L (ref 96–112)
CO2 SERPL-SCNC: 26 MMOL/L (ref 20–33)
CREAT SERPL-MCNC: 0.76 MG/DL (ref 0.5–1.4)
EOSINOPHIL # BLD AUTO: 0 K/UL (ref 0–0.51)
EOSINOPHIL NFR BLD: 0 % (ref 0–6.9)
ERYTHROCYTE [DISTWIDTH] IN BLOOD BY AUTOMATED COUNT: 39.2 FL (ref 35.9–50)
GLOBULIN SER CALC-MCNC: 2.6 G/DL (ref 1.9–3.5)
GLUCOSE SERPL-MCNC: 131 MG/DL (ref 65–99)
HCT VFR BLD AUTO: 39.1 % (ref 42–52)
HGB BLD-MCNC: 13.5 G/DL (ref 14–18)
IMM GRANULOCYTES # BLD AUTO: 0.07 K/UL (ref 0–0.11)
IMM GRANULOCYTES NFR BLD AUTO: 0.4 % (ref 0–0.9)
LYMPHOCYTES # BLD AUTO: 1.24 K/UL (ref 1–4.8)
LYMPHOCYTES NFR BLD: 7.5 % (ref 22–41)
MCH RBC QN AUTO: 29.9 PG (ref 27–33)
MCHC RBC AUTO-ENTMCNC: 34.5 G/DL (ref 33.7–35.3)
MCV RBC AUTO: 86.7 FL (ref 81.4–97.8)
MONOCYTES # BLD AUTO: 0.86 K/UL (ref 0–0.85)
MONOCYTES NFR BLD AUTO: 5.2 % (ref 0–13.4)
NEUTROPHILS # BLD AUTO: 14.41 K/UL (ref 1.82–7.42)
NEUTROPHILS NFR BLD: 86.8 % (ref 44–72)
NRBC # BLD AUTO: 0 K/UL
NRBC BLD-RTO: 0 /100 WBC
PLATELET # BLD AUTO: 187 K/UL (ref 164–446)
PMV BLD AUTO: 10.6 FL (ref 9–12.9)
POTASSIUM SERPL-SCNC: 3.9 MMOL/L (ref 3.6–5.5)
PROT SERPL-MCNC: 6.1 G/DL (ref 6–8.2)
RBC # BLD AUTO: 4.51 M/UL (ref 4.7–6.1)
SODIUM SERPL-SCNC: 135 MMOL/L (ref 135–145)
VANCOMYCIN TROUGH SERPL-MCNC: 10.3 UG/ML (ref 10–20)
WBC # BLD AUTO: 16.6 K/UL (ref 4.8–10.8)

## 2019-04-22 PROCEDURE — 85025 COMPLETE CBC W/AUTO DIFF WBC: CPT

## 2019-04-22 PROCEDURE — A9270 NON-COVERED ITEM OR SERVICE: HCPCS | Performed by: HOSPITALIST

## 2019-04-22 PROCEDURE — 700111 HCHG RX REV CODE 636 W/ 250 OVERRIDE (IP): Performed by: FAMILY MEDICINE

## 2019-04-22 PROCEDURE — 99232 SBSQ HOSP IP/OBS MODERATE 35: CPT | Performed by: HOSPITALIST

## 2019-04-22 PROCEDURE — 80053 COMPREHEN METABOLIC PANEL: CPT

## 2019-04-22 PROCEDURE — 700105 HCHG RX REV CODE 258: Performed by: INTERNAL MEDICINE

## 2019-04-22 PROCEDURE — 36415 COLL VENOUS BLD VENIPUNCTURE: CPT

## 2019-04-22 PROCEDURE — 80202 ASSAY OF VANCOMYCIN: CPT

## 2019-04-22 PROCEDURE — 770006 HCHG ROOM/CARE - MED/SURG/GYN SEMI*

## 2019-04-22 PROCEDURE — 700102 HCHG RX REV CODE 250 W/ 637 OVERRIDE(OP): Performed by: HOSPITALIST

## 2019-04-22 PROCEDURE — 700105 HCHG RX REV CODE 258: Performed by: HOSPITALIST

## 2019-04-22 PROCEDURE — 700111 HCHG RX REV CODE 636 W/ 250 OVERRIDE (IP): Performed by: INTERNAL MEDICINE

## 2019-04-22 PROCEDURE — 700111 HCHG RX REV CODE 636 W/ 250 OVERRIDE (IP): Performed by: HOSPITALIST

## 2019-04-22 PROCEDURE — 99255 IP/OBS CONSLTJ NEW/EST HI 80: CPT | Performed by: INTERNAL MEDICINE

## 2019-04-22 RX ORDER — HYDRALAZINE HYDROCHLORIDE 20 MG/ML
10 INJECTION INTRAMUSCULAR; INTRAVENOUS EVERY 6 HOURS PRN
Status: DISCONTINUED | OUTPATIENT
Start: 2019-04-22 | End: 2019-04-23 | Stop reason: HOSPADM

## 2019-04-22 RX ADMIN — AMPICILLIN SODIUM AND SULBACTAM SODIUM 3 G: 2; 1 INJECTION, POWDER, FOR SOLUTION INTRAMUSCULAR; INTRAVENOUS at 11:45

## 2019-04-22 RX ADMIN — VANCOMYCIN HYDROCHLORIDE 1000 MG: 100 INJECTION, POWDER, LYOPHILIZED, FOR SOLUTION INTRAVENOUS at 09:52

## 2019-04-22 RX ADMIN — NICOTINE 7 MG: 7 PATCH, EXTENDED RELEASE TRANSDERMAL at 06:10

## 2019-04-22 RX ADMIN — NICOTINE POLACRILEX 2 MG: 2 GUM, CHEWING BUCCAL at 20:12

## 2019-04-22 RX ADMIN — AMPICILLIN SODIUM AND SULBACTAM SODIUM 3 G: 2; 1 INJECTION, POWDER, FOR SOLUTION INTRAMUSCULAR; INTRAVENOUS at 06:12

## 2019-04-22 RX ADMIN — VANCOMYCIN HYDROCHLORIDE 1000 MG: 100 INJECTION, POWDER, LYOPHILIZED, FOR SOLUTION INTRAVENOUS at 20:11

## 2019-04-22 RX ADMIN — OXYCODONE HYDROCHLORIDE 5 MG: 5 TABLET ORAL at 15:28

## 2019-04-22 RX ADMIN — NICOTINE POLACRILEX 2 MG: 2 GUM, CHEWING BUCCAL at 11:45

## 2019-04-22 RX ADMIN — OXYCODONE HYDROCHLORIDE 5 MG: 5 TABLET ORAL at 04:18

## 2019-04-22 RX ADMIN — HYDRALAZINE HYDROCHLORIDE 10 MG: 20 INJECTION INTRAMUSCULAR; INTRAVENOUS at 21:35

## 2019-04-22 RX ADMIN — VANCOMYCIN HYDROCHLORIDE 1000 MG: 100 INJECTION, POWDER, LYOPHILIZED, FOR SOLUTION INTRAVENOUS at 01:44

## 2019-04-22 RX ADMIN — AMPICILLIN SODIUM AND SULBACTAM SODIUM 3 G: 2; 1 INJECTION, POWDER, FOR SOLUTION INTRAMUSCULAR; INTRAVENOUS at 19:35

## 2019-04-22 RX ADMIN — SODIUM CHLORIDE: 9 INJECTION, SOLUTION INTRAVENOUS at 04:19

## 2019-04-22 ASSESSMENT — ENCOUNTER SYMPTOMS
SENSORY CHANGE: 0
WHEEZING: 0
NAUSEA: 0
BRUISES/BLEEDS EASILY: 0
SPEECH CHANGE: 0
PALPITATIONS: 0
HEADACHES: 0
VOMITING: 0
DIZZINESS: 0
MYALGIAS: 1
EYE PAIN: 0
SORE THROAT: 0
COUGH: 0
HEMOPTYSIS: 0
CHILLS: 0
DEPRESSION: 0
FEVER: 0
SPUTUM PRODUCTION: 0
BACK PAIN: 0
ABDOMINAL PAIN: 0
EYE DISCHARGE: 0
FOCAL WEAKNESS: 0
LOSS OF CONSCIOUSNESS: 0
WEAKNESS: 0
CONSTIPATION: 0
CLAUDICATION: 0
DIARRHEA: 0
NECK PAIN: 0
SHORTNESS OF BREATH: 0
DIAPHORESIS: 0

## 2019-04-22 ASSESSMENT — PATIENT HEALTH QUESTIONNAIRE - PHQ9
1. LITTLE INTEREST OR PLEASURE IN DOING THINGS: NOT AT ALL
SUM OF ALL RESPONSES TO PHQ9 QUESTIONS 1 AND 2: 0
2. FEELING DOWN, DEPRESSED, IRRITABLE, OR HOPELESS: NOT AT ALL

## 2019-04-22 ASSESSMENT — COGNITIVE AND FUNCTIONAL STATUS - GENERAL
SUGGESTED CMS G CODE MODIFIER DAILY ACTIVITY: CH
MOBILITY SCORE: 24
SUGGESTED CMS G CODE MODIFIER MOBILITY: CH
DAILY ACTIVITIY SCORE: 24

## 2019-04-22 ASSESSMENT — LIFESTYLE VARIABLES: SUBSTANCE_ABUSE: 1

## 2019-04-22 NOTE — CARE PLAN
Problem: Safety  Goal: Will remain free from falls  Outcome: PROGRESSING AS EXPECTED  Falls precautions in place.  Pt educated on the use of the call light and demonstrates use appropriately.    Problem: Infection  Goal: Will remain free from infection  Outcome: PROGRESSING AS EXPECTED  Pt afebrile.  Standard precautions in place.  Monitoring labs and vitals for s/s of infection.  Pt educated on the importance of hand washing especially while in hospital, pt verbalized understanding.

## 2019-04-22 NOTE — PROGRESS NOTES
[facility] Medicine Daily Progress Note    Date of Service  4/22/2019    Chief Complaint  49 y.o. male admitted 4/21/2019 with right hand swelling/abscess.    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  He did not see the culprit.  Seen post op with improved ROM of his hand .  Rt hand thenar area bandaged, mild erythema dorsum of hand remains.  Tdap ordered, lovenox started post op since heavy smoker.  Requesting nicoderm gum in addition to patch.  dc'd IVFs.  Remains on iv vanco and iv unasyn pending OR cultures.   *        Consultants/Specialty  Dr. Tamiko Moscoso    Code Status  full    Disposition  Home once medically cleared    Review of Systems  Review of Systems   Constitutional: Negative for chills, diaphoresis, fever and malaise/fatigue.   HENT: Negative for congestion and sore throat.    Eyes: Negative for pain and discharge.   Respiratory: Negative for cough, hemoptysis, sputum production, shortness of breath and wheezing.    Cardiovascular: Negative for chest pain, palpitations, claudication and leg swelling.   Gastrointestinal: Negative for abdominal pain, constipation, diarrhea, melena, nausea and vomiting.   Genitourinary: Negative for dysuria, frequency and urgency.   Musculoskeletal: Positive for myalgias (right hand pain). Negative for back pain, joint pain and neck pain.   Skin: Negative for itching and rash.   Neurological: Negative for dizziness, sensory change, speech change, focal weakness, loss of consciousness, weakness and headaches.   Endo/Heme/Allergies: Does not bruise/bleed easily.   Psychiatric/Behavioral: Positive for substance abuse (smoker). Negative for depression and suicidal ideas.        Physical Exam  Temp:  [36.4 °C (97.5 °F)-37.3 °C (99.1 °F)] 36.9 °C (98.5 °F)  Pulse:  []  121  Resp:  [16-17] 16  BP: (118-147)/(74-93) 147/93  SpO2:  [95 %-100 %] 95 %    Physical Exam   Constitutional: He is oriented to person, place, and time. He appears well-developed and well-nourished. No distress.   HENT:   Head: Normocephalic and atraumatic.   Mouth/Throat: Oropharynx is clear and moist. No oropharyngeal exudate.   Eyes: Pupils are equal, round, and reactive to light. Conjunctivae and EOM are normal. Right eye exhibits no discharge. Left eye exhibits no discharge. No scleral icterus.   Neck: Normal range of motion. Neck supple. No JVD present. No tracheal deviation present. No thyromegaly present.   Cardiovascular: Normal rate, regular rhythm and normal heart sounds.  Exam reveals no gallop and no friction rub.    No murmur heard.  Pulmonary/Chest: Effort normal and breath sounds normal. No respiratory distress. He has no wheezes. He has no rales. He exhibits no tenderness.   Abdominal: Soft. Bowel sounds are normal. He exhibits no distension and no mass. There is no tenderness. There is no rebound and no guarding.   Musculoskeletal: Normal range of motion. He exhibits edema (improving ROM with right hand  since admission/OR drainage.  erythema remains, bandage in place.) and tenderness.   Lymphadenopathy:     He has no cervical adenopathy.   Neurological: He is alert and oriented to person, place, and time. No cranial nerve deficit. He exhibits normal muscle tone.   Skin: Skin is warm and dry. No rash noted. He is not diaphoretic. There is erythema.   Psychiatric: He has a normal mood and affect. His behavior is normal. Judgment and thought content normal.   Nursing note and vitals reviewed.      Fluids  No intake or output data in the 24 hours ending 04/22/19 1836    Laboratory  Recent Labs      04/21/19   0443  04/22/19   0157   WBC  16.4*  16.6*   RBC  5.21  4.51*   HEMOGLOBIN  15.6  13.5*   HEMATOCRIT  45.6  39.1*   MCV  87.5  86.7   MCH  29.9  29.9   MCHC  34.2  34.5   RDW  40.2  39.2    PLATELETCT  224  187   MPV  10.5  10.6     Recent Labs      04/21/19   0443  04/22/19   0157   SODIUM  136  135   POTASSIUM  3.8  3.9   CHLORIDE  106  102   CO2  23  26   GLUCOSE  112*  131*   BUN  13  13   CREATININE  0.74  0.76   CALCIUM  8.8  8.7                   Imaging  CT-HAND WITH RIGHT   Final Result      Skin thickening and subcutaneous fat stranding in the radial dorsal hand is compatible with cellulitis and phlegmonous change      No soft tissue gas is seen to suggest necrotizing fasciitis. No rim-enhancing abscess is noted      Normal CT appearance of the bony structures           Assessment/Plan  * Cellulitis of right hand- (present on admission)   Assessment & Plan    IV abx broad spectrum   Rapidly progressing and significant amount of pain out or proportion to exam   Pain control   Descilate therapy as appropriate  4/21:  S/p ORIF Dr. Morrison did wash out, stated purulent abscess seen.  4/22:  TdaP ordered.  lovenox post op.  Improvement clinically with IV unasyn and IV vancomycin pending OR cultures. 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     Sepsis (HCC)- (present on admission)   Assessment & Plan    This is sepsis (without associated acute organ dysfunction).   Tachycardia, leukocytosis  Cellulitis source  IV abx   Follow cultures  Trend lactic   descilate therapy as appropriate     Alcohol abuse- (present on admission)   Assessment & Plan    Encouraged cessation      Tobacco abuse- (present on admission)   Assessment & Plan    Greater than 10 minutes spent with patient smoking cessation counseling, discussed cardiovascular risk factors of smoking. Nicotine patch provided  Nicotine gum ordered per patient request.          VTE prophylaxis: scds, lovenox added post op.

## 2019-04-22 NOTE — PROGRESS NOTES
Pharmacy Kinetics Addendum:    49 y.o. male on vancomycin day # 1 4/21/2019    Currently on Vancomycin 1000 mg iv q8hr (~12 mg/kg)    Indication for Treatment: SSTI    Recent Labs      04/21/19   0443   BUN  13   CREATININE  0.74       A/P   1. Vancomycin dose change: not indicated  2. Next vancomycin level: 4/22/19 @0930 (ordered)  3. Goal trough: 12-16 mcg/mL  4. Comments: Was phoned by RN post-op about missed vancomycin doses and errant trough ordered at present. Current dose scheme reasonable. Vancomycin doses re-timed and trough placed prior to AM dose 4/22/19 to assess clearance. Pharmacy will continue to follow.    David Griffith, PharmD

## 2019-04-22 NOTE — OR NURSING
"Pt A&OX4. VSS. Pt on 2 L of oxygen via nasal cannula. R hand post I&D, dressing CDI. RUE elevated on pillows. Cool extremity with cap refill less than three second. Pt can move fingers. Denies numbness/tingling. Reports \"a little\" pain and describes R hand feeling \"heavy.\" Pt declined pain medication when offered. Pt did state nausea, resolved post IV Zofran. Report called to SPENCER Del Toro. Pt out of PACU with transport.   "

## 2019-04-22 NOTE — PROGRESS NOTES
"   Orthopaedic PA Progress Note    Interval changes:Did well overnight. Gram stain - No orgs, await Cx.    ROS - Patient denies any new issues. No chest pain, dyspnea, or fever.  Pain well controlled.    /85   Pulse 91   Temp 36.7 °C (98.1 °F) (Temporal)   Resp 16   Ht 1.803 m (5' 11\")   Wt 84.1 kg (185 lb 6.5 oz)   SpO2 96%     Patient seen and examined  No acute distress  Breathing non labored  RRR  Surgical dressing is clean, dry, and intact. Patient clearly fires forearm flexors, forearm extensors, and moves all five fingers without issue . Sensation is intact to light touch throughout median, ulnar, and radial nerve distributions. Strong and palpable radial pulses with capillary refill less than 2 seconds. No arm or hand discomfort.    Recent Labs      04/21/19   0443  04/22/19   0157   WBC  16.4*  16.6*   RBC  5.21  4.51*   HEMOGLOBIN  15.6  13.5*   HEMATOCRIT  45.6  39.1*   MCV  87.5  86.7   MCH  29.9  29.9   MCHC  34.2  34.5   RDW  40.2  39.2   PLATELETCT  224  187   MPV  10.5  10.6       Active Hospital Problems    Diagnosis   • Sepsis (HCC) [A41.9]     Priority: High   • Cellulitis of right hand [L03.113]   • Tobacco abuse [Z72.0]   • Alcohol abuse [F10.10]       Assessment/Plan:  POD#1 S/P I+D RH  Wt bearing status - AT  PT/OT-initiated  Wound care:dressing change tomorrow  Drains - no  Hansen-no  Sutures/Staples out- 10-14 days post operatively  Antibiotics: per ID  DVT Prophylaxis- TEDS/SCDs/Foot pumps.   Case Coordination for Discharge Planning - Disposition per Med/ID. Follow-Up: needs appointment with Dr. Morrison at  [facility] at 8-10 days post-op for re-evaluation and suture removal.    "

## 2019-04-22 NOTE — CONSULTS
INFECTIOUS DISEASES INPATIENT CONSULT NOTE     Date of Service: 4/22/2019    Consult Requested By: Lilibeth Garcia M.D.    Reason for Consultation: Right hand abscess    History of Present Illness:   Dio Shaver is a 49 y.o. otherwise healthy man admitted 4/21/2019 right hand swelling, pain and erythema.  Patient states that he was working and believes he got bitten by a bug 2 days prior to admission.  Approximately 4-5 hours later, he noted increasing swelling and erythema that spread proximally up his right arm.  He also had associated numbness and tingling in the right second and third digits.  Right hand pain was worse with movement.  He denies any drainage.  No associated fevers, chills, nausea or vomiting.  Given worsening edema and erythema, he presented to the ED for further evaluation and management.  On presentation, he was afebrile with a leukocytosis of 16.4.  CT of the hand revealed skin thickening and fat stranding consistent with cellulitis and phlegmonous change.  There is however no soft tissue gas to suggest necrotizing fasciitis or abscess.  He was evaluated by orthopedic surgery and the patient is now status post I&D of the right hand on 4/21/19.  Purulent fluid was noted intraoperatively and was sent for culture and sensitivity.  OR cultures are currently pending.  Blood cultures from admission are negative to date.  Patient is currently on vancomycin and Unasyn.  Patient denies any diarrhea, abdominal pain or shortness of breath.  Infectious disease service consulted for further antibiotic recommendations and management    Review Of Systems:  All other ROS were reviewed and are negative except as noted above in the HPI.    PMH:   History reviewed. No pertinent past medical history.    PSH:  Past Surgical History:   Procedure Laterality Date   • IRRIGATION & DEBRIDEMENT ORTHO Right 4/21/2019    Procedure: IRRIGATION AND DEBRIDEMENT, WOUND;  Surgeon: Jayson Morrison M.D.;  Location: SURGERY  [facility];  Service: Orthopedics       FAMILY HX:  Both parents with type 2 diabetes mellitus    SOCIAL HX:  Social History     Social History   • Marital status: Single     Spouse name: N/A   • Number of children: N/A   • Years of education: N/A     Occupational History   • Not on file.     Social History Main Topics   • Smoking status: Current Every Day Smoker     Packs/day: 0.33     Types: Cigarettes   • Smokeless tobacco: Never Used   • Alcohol use Yes      Comment: 1 beer daily   • Drug use: No   • Sexual activity: Not on file     Other Topics Concern   • Not on file     Social History Narrative   • No narrative on file     History   Smoking Status   • Current Every Day Smoker   • Packs/day: 0.33   • Types: Cigarettes   Smokeless Tobacco   • Never Used     History   Alcohol Use   • Yes     Comment: 1 beer daily       Allergies/Intolerances:  No Known Allergies    History reviewed with the patient     Other Current Medications:    Current Facility-Administered Medications:   •  NS (BOLUS) infusion 500 mL, 500 mL, Intravenous, Once PRN, Warren Peña M.D.  •  ampicillin/sulbactam (UNASYN) 3 g in  mL IVPB, 3 g, Intravenous, Q6HRS, Warren Peña M.D., Last Rate: 200 mL/hr at 04/22/19 0612, 3 g at 04/22/19 0612  •  MD Alert...Vancomycin per Pharmacy, 1 Each, Other, PHARMACY TO DOSE, Warren Peña M.D.  •  senna-docusate (PERICOLACE or SENOKOT S) 8.6-50 MG per tablet 2 Tab, 2 Tab, Oral, BID **AND** polyethylene glycol/lytes (MIRALAX) PACKET 1 Packet, 1 Packet, Oral, QDAY PRN **AND** magnesium hydroxide (MILK OF MAGNESIA) suspension 30 mL, 30 mL, Oral, QDAY PRN **AND** bisacodyl (DULCOLAX) suppository 10 mg, 10 mg, Rectal, QDAY PRN, Warren Peña M.D.  •  NS infusion, , Intravenous, Continuous, Warren Peña M.D., Last Rate: 125 mL/hr at 04/22/19 0419  •  Notify provider if pain remains uncontrolled, , , CONTINUOUS **AND** Use the numeric rating scale (NRS-11) on regular floors and  "Critical-Care Pain Observation Tool (CPOT) on ICUs/Trauma to assess pain, , , CONTINUOUS **AND** Pulse Ox (Oximetry), , , CONTINUOUS **AND** Pharmacy Consult Request ...Pain Management Review 1 Each, 1 Each, Other, PHARMACY TO DOSE **AND** If patient difficult to arouse and/or has respiratory depression, stop any opiates that are currently infusing and call a Rapid Response., , , CONTINUOUS **AND** oxyCODONE immediate-release (ROXICODONE) tablet 5 mg, 5 mg, Oral, Q3HRS PRN, 5 mg at 19 0418 **AND** oxyCODONE immediate-release (ROXICODONE) tablet 10 mg, 10 mg, Oral, Q3HRS PRN **AND** morphine (pf) 4 mg/ml injection 4 mg, 4 mg, Intravenous, Q3HRS PRN, Warren Peña M.D.  •  nicotine (NICODERM) 7 MG/24HR 7 mg, 7 mg, Transdermal, Daily-0600, Warren Peña M.D., 7 mg at 19 0610  •  vancomycin 1,000 mg in  mL IVPB, 12 mg/kg, Intravenous, Q8HR, Humberto Chambers M.D., Stopped at 19 0344  [unfilled]    Most Recent Vital Signs:  /85   Pulse 91   Temp 36.7 °C (98.1 °F) (Temporal)   Resp 16   Ht 1.803 m (5' 11\")   Wt 84.1 kg (185 lb 6.5 oz)   SpO2 96%   BMI 25.86 kg/m²   Temp  Av.9 °C (98.5 °F)  Min: 36.3 °C (97.4 °F)  Max: 37.4 °C (99.3 °F)    Physical Exam:  General: well-appearing, no acute distress, nontoxic  HEENT: sclera anicteric, PERRL, EOMI, MMM, no oral lesions  Neck: supple, no lymphadenopathy  Chest: CTAB, no r/r/w, normal work of breathing.  Cardiac: RRR, normal S1 S2, no m/r/g   Abdomen: + bowel sounds, soft, non-tender, non-distended, no HSM  Extremities: Right hand in surgical dressing.  There is erythema extending from the dressing down to his digits and also spreading proximally up his R arm.  Fingers are warm and wiggles  Skin: no rashes or worrisome lesions  Neuro: Alert and oriented times 3, non-focal exam, speech fluent, moves all extremities  Psych: Normal mood and behavior, pleasant    Pertinent Lab Results:  Recent Labs      19   0443  " "04/22/19   0157   WBC  16.4*  16.6*      Recent Labs      04/21/19   0443  04/22/19   0157   HEMOGLOBIN  15.6  13.5*   HEMATOCRIT  45.6  39.1*   MCV  87.5  86.7   MCH  29.9  29.9   PLATELETCT  224  187         Recent Labs      04/21/19   0443  04/22/19   0157   SODIUM  136  135   POTASSIUM  3.8  3.9   CHLORIDE  106  102   CO2  23  26   CREATININE  0.74  0.76        Recent Labs      04/22/19   0157   ALBUMIN  3.5        Pertinent Micro:  Results     Procedure Component Value Units Date/Time    GRAM STAIN [[id number]] Collected:  04/21/19 1402    Order Status:  Completed Specimen:  Wound Updated:  04/21/19 2223     Significant Indicator .     Source WND     Site Right Hand     Gram Stain Result No organisms seen.    CULTURE WOUND W/ GRAM STAIN [[id number]] Collected:  04/21/19 1402    Order Status:  Completed Specimen:  Other Updated:  04/21/19 1604    Anaerobic Culture [[id number]] Collected:  04/21/19 1402    Order Status:  Completed Specimen:  Other Updated:  04/21/19 1604    BLOOD CULTURE x2 [[id number]] Collected:  04/21/19 0500    Order Status:  Completed Specimen:  Blood from Peripheral Updated:  04/21/19 0937    Narrative:       Per Hospital Policy: Only change Specimen Src: to \"Line\" if  specified by physician order.    Blood Culture [[id number]]     Order Status:  Sent Specimen:  Blood from Peripheral     Blood Culture [[id number]]     Order Status:  Sent Specimen:  Blood from Peripheral     Urinalysis [[id number]]     Order Status:  Sent Specimen:  Urine     Culture Respiratory W/ GRM STN [[id number]]     Order Status:  Completed Specimen:  Respirate from Sputum     BLOOD CULTURE x2 [[id number]] Collected:  04/21/19 0458    Order Status:  Completed Specimen:  Blood from Peripheral Updated:  04/21/19 0501    Narrative:       Per Hospital Policy: Only change Specimen Src: to \"Line\" if  specified by physician order.        No results found for: BLOODCULTU, BLDCULT, BCHOLD     Studies:  Ct-hand With Right    Result " Date: 4/21/2019 4/21/2019 12:20 PM HISTORY/REASON FOR EXAM:  Atraumatic Pain/Swelling/Deformity Thenar eminence swelling, erythema. Numbness and abnormal temperature sensation TECHNIQUE/EXAM DESCRIPTION AND NUMBER OF VIEWS: CT right hand, wrist and distal forearm with thin section oblique axial images which are reformatted into 2 sagittal, coronal and axial series. Contrast was administered before scanning, 100 mL Omnipaque 350. COMPARISON: None FINDINGS: No acute fracture or subluxation is seen. Joint spaces are preserved. No erosion is seen. No bony spurring. Review of the soft tissues shows no suspicious joint effusion or other abnormal fluid collection. No rim-enhancing abscess is seen. No abnormal soft tissue gas to suggest gas-forming organism. There is some radial skin thickening, subcutaneous fat stranding especially posteriorly Unremarkable flexor and extensor tendons by CT appearance     Skin thickening and subcutaneous fat stranding in the radial dorsal hand is compatible with cellulitis and phlegmonous change No soft tissue gas is seen to suggest necrotizing fasciitis. No rim-enhancing abscess is noted Normal CT appearance of the bony structures      IMPRESSION:   1.  Right hand abscess   2.  Right hand cellulitis  3.  Leukocytosis      PLAN:   Dio Shaver is a 49 y.o. male with no prior medical history admitted for right hand swelling, pain and erythema secondary to a bug bite.  Patient found to have evidence of cellulitis on presentation.  CT revealed cellulitis and soft tissue stranding with phlegmonous change concerning for for an abscess.  He is now status post I&D of the right hand and purulence was noted intraoperatively.  OR cultures are currently pending.  Blood cultures are negative to date.  Continue IV vancomycin and Unasyn for now pending further OR cultures.  Monitor renal function and Vanco trough.  Will narrow antibiotics pending cultures.  Anticipate transition to p.o. antibiotics  at discharge.  Anticipate a 10-14-day course of antibiotics total pending clinical improvement.  Further recommendations per clinical course and culture results.    Plan of care discussed with IM Lilibeth Garcia M.D.. Will continue to follow    Ester Moscoso M.D.      Please note that this dictation was created using voice recognition software. I have worked with technical experts from [facility] to optimize the interface.  I have made every reasonable attempt to correct obvious errors, but there may be errors of grammar and possibly content that I did not discover before finalizing the note.

## 2019-04-22 NOTE — CARE PLAN
Problem: Infection  Goal: Will remain free from infection  Outcome: PROGRESSING AS EXPECTED  Patient is currently receiving IV antibiotics for an infection. Patient up to date with POC and the need for antibiotics. VS stable.

## 2019-04-22 NOTE — PROGRESS NOTES
A&O x4. Patient is anxious to get home.  Room Air.   Pt does not complain of pain.  I&D to right hand, dressing is CDI.  Tolerating regular diet.  Denies N&V.  + void  Patient up with SBA.  All needs meet, call light within reach.

## 2019-04-23 VITALS
RESPIRATION RATE: 16 BRPM | WEIGHT: 185.41 LBS | SYSTOLIC BLOOD PRESSURE: 135 MMHG | OXYGEN SATURATION: 94 % | BODY MASS INDEX: 25.96 KG/M2 | TEMPERATURE: 97.7 F | DIASTOLIC BLOOD PRESSURE: 99 MMHG | HEART RATE: 111 BPM | HEIGHT: 71 IN

## 2019-04-23 LAB
BACTERIA WND AEROBE CULT: ABNORMAL
GRAM STN SPEC: ABNORMAL
SIGNIFICANT IND 70042: ABNORMAL
SITE SITE: ABNORMAL
SOURCE SOURCE: ABNORMAL

## 2019-04-23 PROCEDURE — 700105 HCHG RX REV CODE 258: Performed by: HOSPITALIST

## 2019-04-23 PROCEDURE — 700111 HCHG RX REV CODE 636 W/ 250 OVERRIDE (IP): Performed by: HOSPITALIST

## 2019-04-23 RX ORDER — AMOXICILLIN AND CLAVULANATE POTASSIUM 875; 125 MG/1; MG/1
1 TABLET, FILM COATED ORAL 2 TIMES DAILY
Qty: 24 TAB | Refills: 0 | Status: SHIPPED | OUTPATIENT
Start: 2019-04-23 | End: 2019-05-05

## 2019-04-23 RX ORDER — SULFAMETHOXAZOLE AND TRIMETHOPRIM 800; 160 MG/1; MG/1
1 TABLET ORAL 2 TIMES DAILY
Qty: 24 TAB | Refills: 0 | Status: SHIPPED | OUTPATIENT
Start: 2019-04-23 | End: 2019-05-05

## 2019-04-23 RX ADMIN — AMPICILLIN SODIUM AND SULBACTAM SODIUM 3 G: 2; 1 INJECTION, POWDER, FOR SOLUTION INTRAMUSCULAR; INTRAVENOUS at 02:37

## 2019-04-23 NOTE — PROGRESS NOTES
I was paged by the nurse that the patient had decided to leave AGAINST MEDICAL ADVICE.  I had a discussion with the patient and he feels that he has received sufficient antibiotics and his infection has resolved.  I recommended to stay 3 hours longer and be evaluated by infectious disease, Ortho and his primary hospitalist but the patient was adamant that he wanted to leave now.  I explained to him the risk of worsening infection, sepsis and possibly death if he left without full treatment.  The patient is capacitated to make his own decisions and understands the risks.  He will be leaving AGAINST MEDICAL ADVICE.  I have instructed him to take Augmentin and Bactrim for 12 days, which I have E prescribed to his pharmacy.  I instructed him to follow-up with his PCP as soon as possible and to return to the ER if he develops worsening infection.  The patient verbalized understanding.

## 2019-04-23 NOTE — PROGRESS NOTES
"Pharmacy Kinetics 49 y.o. male on vancomycin day # 2  2019    Currently on Vancomycin 1,000mg iv q8hr (0200, 1000, 1800)    Indication for Treatment: R hand abscess     Pertinent history per medical record: Admitted on 2019 for right hand swelling, pain and erythema. Patient reports bug bite 2 days PTA. Empiric antibiotics initiated. ID consulted.     Other antibiotics: Unasyn 3g IV q6h     Allergies: Patient has no known allergies.     List concerns for renal function: None.     Pertinent cultures to date:   R hand wound culture = Group A strep and Staph Aureus   Blood Culture   Date Value Ref Range Status   2019   Preliminary    No Growth    Note: Blood cultures are incubated for 5 days and  are monitored continuously.Positive blood cultures  are called to the RN and reported as soon as  they are identified.        MRSA nares swab if pneumonia is a concern (ordered/positive/negative/n-a): N/A    Recent Labs      19   0443  19   0157   WBC  16.4*  16.6*   NEUTSPOLYS  80.10*  86.80*     Recent Labs      19   0443  19   0157   BUN  13  13   CREATININE  0.74  0.76   ALBUMIN   --   3.5     Recent Labs      19   0946   VANCOTROUGH  10.3   No intake or output data in the 24 hours ending 19 1843   /93   Pulse (!) 121   Temp 36.9 °C (98.5 °F) (Temporal)   Resp 16   Ht 1.803 m (5' 11\")   Wt 84.1 kg (185 lb 6.5 oz)   SpO2 95%  Temp (24hrs), Av.8 °C (98.3 °F), Min:36.4 °C (97.5 °F), Max:37.3 °C (99.1 °F)      A/P   1. Vancomycin dose change: No.   2. Next vancomycin level: ~ 2 days   3. Goal trough: 12 - 16 mcg/mL   4. Comments: Vancomycin trough level resulted this morning at 10.3 mcg/mL, true trough level, just below goal range as outlined above prior to 4th total dose of vancomycin. Continue current dosing regimen. Cultures positive for group A strep and Staph aureus - awaiting staph sensitivities. ID consulting - continue current coverage pending culture " results.     Yelena K. Severiano, PharmD

## 2019-04-24 LAB
BACTERIA SPEC ANAEROBE CULT: NORMAL
SIGNIFICANT IND 70042: NORMAL
SITE SITE: NORMAL
SOURCE SOURCE: NORMAL

## 2019-04-26 LAB
BACTERIA BLD CULT: NORMAL
BACTERIA BLD CULT: NORMAL
SIGNIFICANT IND 70042: NORMAL
SIGNIFICANT IND 70042: NORMAL
SITE SITE: NORMAL
SITE SITE: NORMAL
SOURCE SOURCE: NORMAL
SOURCE SOURCE: NORMAL

## 2019-04-30 NOTE — DOCUMENTATION QUERY
[facility]                                                                       Query Response Note      PATIENT:               NEO MCALLISTER  ACCT #:                  [id number]  MRN:                     [id number]  :                      1970  ADMIT DATE:       2019 4:31 AM  DISCH DATE:        2019 4:02 AM  RESPONDING  PROVIDER #:        [id number]           QUERY TEXT:    Positive wound cultures are noted in the Lab Results. Per coding guidelines, the documentation needs to show a direct link between sepsis and the causative organism in order to assign a more specific code for sepsis.  Please clarify the relationship, if any, between sepsis and Beta Hemolytic Streptococcus group A and Staphylococcus aureus.    NOTE:  If an appropriate response is not listed below, please respond with a new note.    The patient's Clinical Indicators include:  Right Hand Wound Cx:   Beta Hemolytic Streptococcus group A Light growth   Staphylococcus aureus Rare growth     Treatment:   Surgical I&D of right hand, ID Consultation, clindamycin, Vanco, & Unasyn     Risk Factors:   Sepsis, right hand cellulitis & abscess, & positive wound cultures    Query created by: Elsa Marquis on 2019 5:10 PM    RESPONSE TEXT:    Sepsis is due to or associated with Beta Hemolytic Streptococcus group A          Electronically signed by:  NUNO CERNA MD 2019 7:41 AM

## 2024-01-16 ENCOUNTER — HOSPITAL ENCOUNTER (INPATIENT)
Facility: MEDICAL CENTER | Age: 54
LOS: 1 days | DRG: 194 | End: 2024-01-18
Attending: EMERGENCY MEDICINE | Admitting: STUDENT IN AN ORGANIZED HEALTH CARE EDUCATION/TRAINING PROGRAM
Payer: MEDICAID

## 2024-01-16 ENCOUNTER — APPOINTMENT (OUTPATIENT)
Dept: RADIOLOGY | Facility: MEDICAL CENTER | Age: 54
DRG: 194 | End: 2024-01-16
Attending: EMERGENCY MEDICINE
Payer: MEDICAID

## 2024-01-16 DIAGNOSIS — R05.1 ACUTE COUGH: ICD-10-CM

## 2024-01-16 DIAGNOSIS — A41.9 SEPSIS, DUE TO UNSPECIFIED ORGANISM, UNSPECIFIED WHETHER ACUTE ORGAN DYSFUNCTION PRESENT (HCC): ICD-10-CM

## 2024-01-16 DIAGNOSIS — J18.9 PNEUMONIA DUE TO INFECTIOUS ORGANISM, UNSPECIFIED LATERALITY, UNSPECIFIED PART OF LUNG: ICD-10-CM

## 2024-01-16 LAB
ALBUMIN SERPL BCP-MCNC: 3.6 G/DL (ref 3.2–4.9)
ALBUMIN/GLOB SERPL: 0.9 G/DL
ALP SERPL-CCNC: 117 U/L (ref 30–99)
ALT SERPL-CCNC: 19 U/L (ref 2–50)
ANION GAP SERPL CALC-SCNC: 13 MMOL/L (ref 7–16)
AST SERPL-CCNC: 24 U/L (ref 12–45)
BASOPHILS # BLD AUTO: 0.3 % (ref 0–1.8)
BASOPHILS # BLD: 0.05 K/UL (ref 0–0.12)
BILIRUB SERPL-MCNC: 0.8 MG/DL (ref 0.1–1.5)
BUN SERPL-MCNC: 11 MG/DL (ref 8–22)
CALCIUM ALBUM COR SERPL-MCNC: 8.7 MG/DL (ref 8.5–10.5)
CALCIUM SERPL-MCNC: 8.4 MG/DL (ref 8.5–10.5)
CHLORIDE SERPL-SCNC: 98 MMOL/L (ref 96–112)
CO2 SERPL-SCNC: 21 MMOL/L (ref 20–33)
CREAT SERPL-MCNC: 0.9 MG/DL (ref 0.5–1.4)
EKG IMPRESSION: NORMAL
EOSINOPHIL # BLD AUTO: 0.02 K/UL (ref 0–0.51)
EOSINOPHIL NFR BLD: 0.1 % (ref 0–6.9)
ERYTHROCYTE [DISTWIDTH] IN BLOOD BY AUTOMATED COUNT: 33.8 FL (ref 35.9–50)
GFR SERPLBLD CREATININE-BSD FMLA CKD-EPI: 102 ML/MIN/1.73 M 2
GLOBULIN SER CALC-MCNC: 3.8 G/DL (ref 1.9–3.5)
GLUCOSE SERPL-MCNC: 123 MG/DL (ref 65–99)
HCT VFR BLD AUTO: 41.2 % (ref 42–52)
HGB BLD-MCNC: 14.6 G/DL (ref 14–18)
IMM GRANULOCYTES # BLD AUTO: 0.11 K/UL (ref 0–0.11)
IMM GRANULOCYTES NFR BLD AUTO: 0.6 % (ref 0–0.9)
LACTATE SERPL-SCNC: 1.2 MMOL/L (ref 0.5–2)
LYMPHOCYTES # BLD AUTO: 2.64 K/UL (ref 1–4.8)
LYMPHOCYTES NFR BLD: 13.7 % (ref 22–41)
MCH RBC QN AUTO: 28.8 PG (ref 27–33)
MCHC RBC AUTO-ENTMCNC: 35.4 G/DL (ref 32.3–36.5)
MCV RBC AUTO: 81.3 FL (ref 81.4–97.8)
MONOCYTES # BLD AUTO: 1.3 K/UL (ref 0–0.85)
MONOCYTES NFR BLD AUTO: 6.7 % (ref 0–13.4)
NEUTROPHILS # BLD AUTO: 15.18 K/UL (ref 1.82–7.42)
NEUTROPHILS NFR BLD: 78.6 % (ref 44–72)
NRBC # BLD AUTO: 0 K/UL
NRBC BLD-RTO: 0 /100 WBC (ref 0–0.2)
PLATELET # BLD AUTO: 386 K/UL (ref 164–446)
PMV BLD AUTO: 9.6 FL (ref 9–12.9)
POTASSIUM SERPL-SCNC: 3.8 MMOL/L (ref 3.6–5.5)
PROT SERPL-MCNC: 7.4 G/DL (ref 6–8.2)
RBC # BLD AUTO: 5.07 M/UL (ref 4.7–6.1)
SODIUM SERPL-SCNC: 132 MMOL/L (ref 135–145)
WBC # BLD AUTO: 19.3 K/UL (ref 4.8–10.8)

## 2024-01-16 PROCEDURE — 700105 HCHG RX REV CODE 258: Mod: UD | Performed by: EMERGENCY MEDICINE

## 2024-01-16 PROCEDURE — 99285 EMERGENCY DEPT VISIT HI MDM: CPT

## 2024-01-16 PROCEDURE — 83605 ASSAY OF LACTIC ACID: CPT

## 2024-01-16 PROCEDURE — 85025 COMPLETE CBC W/AUTO DIFF WBC: CPT

## 2024-01-16 PROCEDURE — 96374 THER/PROPH/DIAG INJ IV PUSH: CPT

## 2024-01-16 PROCEDURE — 700102 HCHG RX REV CODE 250 W/ 637 OVERRIDE(OP): Performed by: EMERGENCY MEDICINE

## 2024-01-16 PROCEDURE — 93005 ELECTROCARDIOGRAM TRACING: CPT | Performed by: EMERGENCY MEDICINE

## 2024-01-16 PROCEDURE — A9270 NON-COVERED ITEM OR SERVICE: HCPCS | Performed by: EMERGENCY MEDICINE

## 2024-01-16 PROCEDURE — 84484 ASSAY OF TROPONIN QUANT: CPT

## 2024-01-16 PROCEDURE — 700111 HCHG RX REV CODE 636 W/ 250 OVERRIDE (IP): Mod: JZ,UD | Performed by: EMERGENCY MEDICINE

## 2024-01-16 PROCEDURE — 36415 COLL VENOUS BLD VENIPUNCTURE: CPT

## 2024-01-16 PROCEDURE — 0241U HCHG SARS-COV-2 COVID-19 NFCT DS RESP RNA 4 TRGT ED POC: CPT

## 2024-01-16 PROCEDURE — 80053 COMPREHEN METABOLIC PANEL: CPT

## 2024-01-16 PROCEDURE — 71045 X-RAY EXAM CHEST 1 VIEW: CPT

## 2024-01-16 PROCEDURE — 87040 BLOOD CULTURE FOR BACTERIA: CPT | Mod: 91

## 2024-01-16 RX ORDER — SODIUM CHLORIDE, SODIUM LACTATE, POTASSIUM CHLORIDE, AND CALCIUM CHLORIDE .6; .31; .03; .02 G/100ML; G/100ML; G/100ML; G/100ML
30 INJECTION, SOLUTION INTRAVENOUS ONCE
Status: COMPLETED | OUTPATIENT
Start: 2024-01-17 | End: 2024-01-17

## 2024-01-16 RX ORDER — CEFTRIAXONE 2 G/1
2000 INJECTION, POWDER, FOR SOLUTION INTRAMUSCULAR; INTRAVENOUS ONCE
Status: COMPLETED | OUTPATIENT
Start: 2024-01-17 | End: 2024-01-16

## 2024-01-16 RX ORDER — ACETAMINOPHEN 325 MG/1
650 TABLET ORAL ONCE
Status: COMPLETED | OUTPATIENT
Start: 2024-01-16 | End: 2024-01-16

## 2024-01-16 RX ORDER — AZITHROMYCIN 250 MG/1
500 TABLET, FILM COATED ORAL ONCE
Status: COMPLETED | OUTPATIENT
Start: 2024-01-17 | End: 2024-01-16

## 2024-01-16 RX ADMIN — SODIUM CHLORIDE, POTASSIUM CHLORIDE, SODIUM LACTATE AND CALCIUM CHLORIDE 2517 ML: 600; 310; 30; 20 INJECTION, SOLUTION INTRAVENOUS at 23:53

## 2024-01-16 RX ADMIN — ACETAMINOPHEN 650 MG: 325 TABLET, FILM COATED ORAL at 23:29

## 2024-01-16 RX ADMIN — AZITHROMYCIN DIHYDRATE 500 MG: 250 TABLET, FILM COATED ORAL at 23:48

## 2024-01-16 RX ADMIN — CEFTRIAXONE SODIUM 2000 MG: 2 INJECTION, POWDER, FOR SOLUTION INTRAMUSCULAR; INTRAVENOUS at 23:48

## 2024-01-17 PROBLEM — Z59.00 HOMELESSNESS: Status: ACTIVE | Noted: 2024-01-17

## 2024-01-17 PROBLEM — J18.9 PNEUMONIA OF LEFT LOWER LOBE DUE TO INFECTIOUS ORGANISM: Status: ACTIVE | Noted: 2024-01-17

## 2024-01-17 PROBLEM — F15.10 METHAMPHETAMINE ABUSE (HCC): Status: ACTIVE | Noted: 2024-01-17

## 2024-01-17 PROBLEM — R65.10 SIRS (SYSTEMIC INFLAMMATORY RESPONSE SYNDROME) (HCC): Status: ACTIVE | Noted: 2024-01-17

## 2024-01-17 LAB
AMPHET UR QL SCN: POSITIVE
APPEARANCE UR: CLEAR
BARBITURATES UR QL SCN: NEGATIVE
BENZODIAZ UR QL SCN: NEGATIVE
BILIRUB UR QL STRIP.AUTO: NEGATIVE
BZE UR QL SCN: NEGATIVE
CANNABINOIDS UR QL SCN: NEGATIVE
COLOR UR: YELLOW
FENTANYL UR QL: NEGATIVE
FLUAV RNA SPEC QL NAA+PROBE: NEGATIVE
FLUBV RNA SPEC QL NAA+PROBE: NEGATIVE
GLUCOSE UR STRIP.AUTO-MCNC: NEGATIVE MG/DL
KETONES UR STRIP.AUTO-MCNC: NEGATIVE MG/DL
LEUKOCYTE ESTERASE UR QL STRIP.AUTO: NEGATIVE
METHADONE UR QL SCN: NEGATIVE
MICRO URNS: NORMAL
NITRITE UR QL STRIP.AUTO: NEGATIVE
OPIATES UR QL SCN: NEGATIVE
OXYCODONE UR QL SCN: NEGATIVE
PCP UR QL SCN: NEGATIVE
PH UR STRIP.AUTO: 7 [PH] (ref 5–8)
PROPOXYPH UR QL SCN: NEGATIVE
PROT UR QL STRIP: NEGATIVE MG/DL
RBC UR QL AUTO: NEGATIVE
RSV RNA SPEC QL NAA+PROBE: NEGATIVE
SARS-COV-2 RNA RESP QL NAA+PROBE: NOTDETECTED
SP GR UR STRIP.AUTO: 1.01
TROPONIN T SERPL-MCNC: 19 NG/L (ref 6–19)
TROPONIN T SERPL-MCNC: 21 NG/L (ref 6–19)
UROBILINOGEN UR STRIP.AUTO-MCNC: 2 MG/DL

## 2024-01-17 PROCEDURE — 99222 1ST HOSP IP/OBS MODERATE 55: CPT | Performed by: STUDENT IN AN ORGANIZED HEALTH CARE EDUCATION/TRAINING PROGRAM

## 2024-01-17 PROCEDURE — A9270 NON-COVERED ITEM OR SERVICE: HCPCS

## 2024-01-17 PROCEDURE — 80307 DRUG TEST PRSMV CHEM ANLYZR: CPT

## 2024-01-17 PROCEDURE — A9270 NON-COVERED ITEM OR SERVICE: HCPCS | Performed by: STUDENT IN AN ORGANIZED HEALTH CARE EDUCATION/TRAINING PROGRAM

## 2024-01-17 PROCEDURE — 700102 HCHG RX REV CODE 250 W/ 637 OVERRIDE(OP): Performed by: STUDENT IN AN ORGANIZED HEALTH CARE EDUCATION/TRAINING PROGRAM

## 2024-01-17 PROCEDURE — 700102 HCHG RX REV CODE 250 W/ 637 OVERRIDE(OP)

## 2024-01-17 PROCEDURE — 770006 HCHG ROOM/CARE - MED/SURG/GYN SEMI*

## 2024-01-17 PROCEDURE — 700111 HCHG RX REV CODE 636 W/ 250 OVERRIDE (IP): Performed by: STUDENT IN AN ORGANIZED HEALTH CARE EDUCATION/TRAINING PROGRAM

## 2024-01-17 PROCEDURE — 700101 HCHG RX REV CODE 250: Performed by: STUDENT IN AN ORGANIZED HEALTH CARE EDUCATION/TRAINING PROGRAM

## 2024-01-17 PROCEDURE — 84484 ASSAY OF TROPONIN QUANT: CPT

## 2024-01-17 PROCEDURE — A9270 NON-COVERED ITEM OR SERVICE: HCPCS | Performed by: HOSPITALIST

## 2024-01-17 PROCEDURE — 700102 HCHG RX REV CODE 250 W/ 637 OVERRIDE(OP): Performed by: HOSPITALIST

## 2024-01-17 PROCEDURE — 99233 SBSQ HOSP IP/OBS HIGH 50: CPT | Performed by: HOSPITALIST

## 2024-01-17 PROCEDURE — 81003 URINALYSIS AUTO W/O SCOPE: CPT

## 2024-01-17 PROCEDURE — 87086 URINE CULTURE/COLONY COUNT: CPT

## 2024-01-17 RX ORDER — GUAIFENESIN 600 MG/1
600 TABLET, EXTENDED RELEASE ORAL EVERY 12 HOURS
Status: DISCONTINUED | OUTPATIENT
Start: 2024-01-17 | End: 2024-01-18 | Stop reason: HOSPADM

## 2024-01-17 RX ORDER — ACETAMINOPHEN 325 MG/1
650 TABLET ORAL EVERY 6 HOURS PRN
Status: DISCONTINUED | OUTPATIENT
Start: 2024-01-17 | End: 2024-01-18 | Stop reason: HOSPADM

## 2024-01-17 RX ORDER — POLYETHYLENE GLYCOL 3350 17 G/17G
1 POWDER, FOR SOLUTION ORAL
Status: DISCONTINUED | OUTPATIENT
Start: 2024-01-17 | End: 2024-01-18 | Stop reason: HOSPADM

## 2024-01-17 RX ORDER — BENZONATATE 100 MG/1
100 CAPSULE ORAL 3 TIMES DAILY PRN
Status: DISCONTINUED | OUTPATIENT
Start: 2024-01-17 | End: 2024-01-18 | Stop reason: HOSPADM

## 2024-01-17 RX ORDER — IBUPROFEN 400 MG/1
600 TABLET ORAL ONCE
Status: COMPLETED | OUTPATIENT
Start: 2024-01-17 | End: 2024-01-17

## 2024-01-17 RX ORDER — AZITHROMYCIN 250 MG/1
500 TABLET, FILM COATED ORAL DAILY
Status: DISCONTINUED | OUTPATIENT
Start: 2024-01-17 | End: 2024-01-18 | Stop reason: HOSPADM

## 2024-01-17 RX ORDER — AMOXICILLIN 250 MG
2 CAPSULE ORAL 2 TIMES DAILY
Status: DISCONTINUED | OUTPATIENT
Start: 2024-01-17 | End: 2024-01-18 | Stop reason: HOSPADM

## 2024-01-17 RX ORDER — BISACODYL 10 MG
10 SUPPOSITORY, RECTAL RECTAL
Status: DISCONTINUED | OUTPATIENT
Start: 2024-01-17 | End: 2024-01-18 | Stop reason: HOSPADM

## 2024-01-17 RX ORDER — ENOXAPARIN SODIUM 100 MG/ML
40 INJECTION SUBCUTANEOUS DAILY
Status: DISCONTINUED | OUTPATIENT
Start: 2024-01-17 | End: 2024-01-18 | Stop reason: HOSPADM

## 2024-01-17 RX ADMIN — CEFTRIAXONE SODIUM 2000 MG: 10 INJECTION, POWDER, FOR SOLUTION INTRAVENOUS at 22:12

## 2024-01-17 RX ADMIN — AZITHROMYCIN DIHYDRATE 500 MG: 250 TABLET, FILM COATED ORAL at 22:12

## 2024-01-17 RX ADMIN — SENNOSIDES AND DOCUSATE SODIUM 2 TABLET: 50; 8.6 TABLET ORAL at 18:00

## 2024-01-17 RX ADMIN — IBUPROFEN 600 MG: 400 TABLET, FILM COATED ORAL at 23:28

## 2024-01-17 RX ADMIN — GUAIFENESIN 600 MG: 600 TABLET, EXTENDED RELEASE ORAL at 17:30

## 2024-01-17 RX ADMIN — ENOXAPARIN SODIUM 40 MG: 100 INJECTION SUBCUTANEOUS at 17:03

## 2024-01-17 RX ADMIN — BENZONATATE 100 MG: 100 CAPSULE ORAL at 22:11

## 2024-01-17 ASSESSMENT — ENCOUNTER SYMPTOMS
FEVER: 1
WHEEZING: 1
SPUTUM PRODUCTION: 1
DIAPHORESIS: 1
CHILLS: 1
EYE DISCHARGE: 0
ABDOMINAL PAIN: 0
SHORTNESS OF BREATH: 1
WEAKNESS: 0
CLAUDICATION: 0
MYALGIAS: 0
DIZZINESS: 0
EYE PAIN: 0
VOMITING: 0
COUGH: 1
FOCAL WEAKNESS: 0
BRUISES/BLEEDS EASILY: 0
NECK PAIN: 0
NAUSEA: 0
SORE THROAT: 0
HEMOPTYSIS: 0
DIARRHEA: 0
PALPITATIONS: 0
SPEECH CHANGE: 0
CONSTIPATION: 0
HEADACHES: 0
LOSS OF CONSCIOUSNESS: 0
BACK PAIN: 0
DEPRESSION: 0
SENSORY CHANGE: 0

## 2024-01-17 ASSESSMENT — PAIN DESCRIPTION - PAIN TYPE
TYPE: ACUTE PAIN

## 2024-01-17 ASSESSMENT — LIFESTYLE VARIABLES
TOTAL SCORE: 0
ALCOHOL_USE: YES
TOTAL SCORE: 0
HAVE YOU EVER FELT YOU SHOULD CUT DOWN ON YOUR DRINKING: NO
DOES PATIENT WANT TO STOP DRINKING: NO
HAVE PEOPLE ANNOYED YOU BY CRITICIZING YOUR DRINKING: NO
EVER HAD A DRINK FIRST THING IN THE MORNING TO STEADY YOUR NERVES TO GET RID OF A HANGOVER: NO
TOTAL SCORE: 0
CONSUMPTION TOTAL: INCOMPLETE
EVER FELT BAD OR GUILTY ABOUT YOUR DRINKING: NO
SUBSTANCE_ABUSE: 0

## 2024-01-17 ASSESSMENT — COGNITIVE AND FUNCTIONAL STATUS - GENERAL
SUGGESTED CMS G CODE MODIFIER MOBILITY: CJ
SUGGESTED CMS G CODE MODIFIER DAILY ACTIVITY: CH
MOBILITY SCORE: 22
MOVING FROM LYING ON BACK TO SITTING ON SIDE OF FLAT BED: A LITTLE
CLIMB 3 TO 5 STEPS WITH RAILING: A LITTLE
DAILY ACTIVITIY SCORE: 24

## 2024-01-17 ASSESSMENT — PATIENT HEALTH QUESTIONNAIRE - PHQ9
2. FEELING DOWN, DEPRESSED, IRRITABLE, OR HOPELESS: NOT AT ALL
SUM OF ALL RESPONSES TO PHQ9 QUESTIONS 1 AND 2: 0
1. LITTLE INTEREST OR PLEASURE IN DOING THINGS: NOT AT ALL

## 2024-01-17 ASSESSMENT — FIBROSIS 4 INDEX: FIB4 SCORE: 0.76

## 2024-01-17 NOTE — ASSESSMENT & PLAN NOTE
1/18/2024  SIRS criteria identified on my evaluation include:  Fever, with temperature greater than 100.9 deg F, Tachycardia, with heart rate greater than 90 BPM, Tachypnea, with respirations greater than 20 per minute, and Leukocytosis, with WBC greater than 12,000  Secondary to pneumonia, no signs of endorgan damage

## 2024-01-17 NOTE — ED NOTES
Pt transported to [facility] from [facility]. Pt informed of sputum sample collection order, pt verbalized understanding.

## 2024-01-17 NOTE — CARE PLAN
The patient is Stable - Low risk of patient condition declining or worsening    Shift Goals  Clinical Goals: Patient will remain comfortable throughout this shift  Patient Goals: Rest  Family Goals: ELISEO    Progress made toward(s) clinical / shift goals:    Problem: Knowledge Deficit - Standard  Goal: Patient and family/care givers will demonstrate understanding of plan of care, disease process/condition, diagnostic tests and medications  Outcome: Progressing  Note: Patient laying in bed denies any discomfort at this time. Educated on POC.        Patient is not progressing towards the following goals:n/a
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
## 2024-01-17 NOTE — ED NOTES
Med rec complete per patient  Allergies reviewed.   Outpatient antibiotics in the last 30 days? No   Anticoagulants taken in the last 14 days? No    Quynh Duran CPhT
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
## 2024-01-17 NOTE — ED NOTES
ERP at bedside.  Phlebotomist draw 2 sets of blood cultures.  Medicated pt per MAR.   Swab collected, placed on Cepheid machine.

## 2024-01-17 NOTE — PROGRESS NOTES
4 Eyes Skin Assessment Completed by SPENCER Blood and SPENCER Askew.    Head WDL  Ears WDL  Nose WDL  Mouth WDL  Neck WDL  Breast/Chest WDL  Shoulder Blades WDL  Spine WDL  (R) Arm/Elbow/Hand WDL  (L) Arm/Elbow/Hand WDL, bump   Abdomen WDL  Groin WDL  Scrotum/Coccyx/Buttocks WDL  (R) Leg WDL  (L) Leg WDL  (R) Heel/Foot/Toe WDL, dry  (L) Heel/Foot/Toe WDL, dry          Devices In Places: n/a       Interventions In Place Pillows    Possible Skin Injury No    Pictures Uploaded Into Epic N/A  Wound Consult Placed N/A  RN Wound Prevention Protocol Ordered No

## 2024-01-17 NOTE — ASSESSMENT & PLAN NOTE
1/18/2024  Says he does not drink daily anymore but does drink once or twice a week  Monitor for withdrawal and start CIWA if necessary
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
## 2024-01-17 NOTE — ASSESSMENT & PLAN NOTE
1/18/2024  Symptoms for 5 days now, including fevers, chills, shortness of breath, productive cough   CXR reviewed and shows opacity in the left  Viral panel negative, procalcitonin ordered  4/4 SIRS criteria.  start on IV antibiotics  1/17:  MRSA nasal negative, pending sputum culture  BCs x2 no growth to date  Mucinex  Albuterol neb prn   Tessalon pearles prn cough.  Repeat wbc in a.m.

## 2024-01-18 ENCOUNTER — PHARMACY VISIT (OUTPATIENT)
Dept: PHARMACY | Facility: MEDICAL CENTER | Age: 54
End: 2024-01-18
Payer: COMMERCIAL

## 2024-01-18 VITALS
WEIGHT: 185.19 LBS | OXYGEN SATURATION: 97 % | RESPIRATION RATE: 16 BRPM | HEIGHT: 71 IN | BODY MASS INDEX: 25.93 KG/M2 | HEART RATE: 80 BPM | SYSTOLIC BLOOD PRESSURE: 118 MMHG | TEMPERATURE: 96.9 F | DIASTOLIC BLOOD PRESSURE: 88 MMHG

## 2024-01-18 PROBLEM — R65.10 SIRS (SYSTEMIC INFLAMMATORY RESPONSE SYNDROME) (HCC): Status: RESOLVED | Noted: 2024-01-17 | Resolved: 2024-01-18

## 2024-01-18 PROBLEM — R05.9 COUGH: Status: ACTIVE | Noted: 2024-01-18

## 2024-01-18 LAB
ANION GAP SERPL CALC-SCNC: 10 MMOL/L (ref 7–16)
BASOPHILS # BLD AUTO: 0.6 % (ref 0–1.8)
BASOPHILS # BLD: 0.07 K/UL (ref 0–0.12)
BUN SERPL-MCNC: 10 MG/DL (ref 8–22)
CALCIUM SERPL-MCNC: 8.3 MG/DL (ref 8.5–10.5)
CHLORIDE SERPL-SCNC: 105 MMOL/L (ref 96–112)
CO2 SERPL-SCNC: 21 MMOL/L (ref 20–33)
CREAT SERPL-MCNC: 0.82 MG/DL (ref 0.5–1.4)
EOSINOPHIL # BLD AUTO: 0.39 K/UL (ref 0–0.51)
EOSINOPHIL NFR BLD: 3.1 % (ref 0–6.9)
ERYTHROCYTE [DISTWIDTH] IN BLOOD BY AUTOMATED COUNT: 36.4 FL (ref 35.9–50)
GFR SERPLBLD CREATININE-BSD FMLA CKD-EPI: 105 ML/MIN/1.73 M 2
GLUCOSE SERPL-MCNC: 90 MG/DL (ref 65–99)
HCT VFR BLD AUTO: 39.5 % (ref 42–52)
HGB BLD-MCNC: 13.4 G/DL (ref 14–18)
IMM GRANULOCYTES # BLD AUTO: 0.11 K/UL (ref 0–0.11)
IMM GRANULOCYTES NFR BLD AUTO: 0.9 % (ref 0–0.9)
LYMPHOCYTES # BLD AUTO: 3.62 K/UL (ref 1–4.8)
LYMPHOCYTES NFR BLD: 28.7 % (ref 22–41)
MCH RBC QN AUTO: 29 PG (ref 27–33)
MCHC RBC AUTO-ENTMCNC: 33.9 G/DL (ref 32.3–36.5)
MCV RBC AUTO: 85.5 FL (ref 81.4–97.8)
MONOCYTES # BLD AUTO: 0.9 K/UL (ref 0–0.85)
MONOCYTES NFR BLD AUTO: 7.1 % (ref 0–13.4)
NEUTROPHILS # BLD AUTO: 7.53 K/UL (ref 1.82–7.42)
NEUTROPHILS NFR BLD: 59.6 % (ref 44–72)
NRBC # BLD AUTO: 0 K/UL
NRBC BLD-RTO: 0 /100 WBC (ref 0–0.2)
PLATELET # BLD AUTO: 353 K/UL (ref 164–446)
PMV BLD AUTO: 9.9 FL (ref 9–12.9)
POTASSIUM SERPL-SCNC: 4.2 MMOL/L (ref 3.6–5.5)
RBC # BLD AUTO: 4.62 M/UL (ref 4.7–6.1)
SODIUM SERPL-SCNC: 136 MMOL/L (ref 135–145)
WBC # BLD AUTO: 12.6 K/UL (ref 4.8–10.8)

## 2024-01-18 PROCEDURE — 99239 HOSP IP/OBS DSCHRG MGMT >30: CPT | Performed by: INTERNAL MEDICINE

## 2024-01-18 PROCEDURE — A9270 NON-COVERED ITEM OR SERVICE: HCPCS | Performed by: STUDENT IN AN ORGANIZED HEALTH CARE EDUCATION/TRAINING PROGRAM

## 2024-01-18 PROCEDURE — 80048 BASIC METABOLIC PNL TOTAL CA: CPT

## 2024-01-18 PROCEDURE — 700102 HCHG RX REV CODE 250 W/ 637 OVERRIDE(OP): Performed by: HOSPITALIST

## 2024-01-18 PROCEDURE — A9270 NON-COVERED ITEM OR SERVICE: HCPCS | Performed by: HOSPITALIST

## 2024-01-18 PROCEDURE — 85025 COMPLETE CBC W/AUTO DIFF WBC: CPT

## 2024-01-18 PROCEDURE — 700102 HCHG RX REV CODE 250 W/ 637 OVERRIDE(OP): Performed by: STUDENT IN AN ORGANIZED HEALTH CARE EDUCATION/TRAINING PROGRAM

## 2024-01-18 PROCEDURE — RXMED WILLOW AMBULATORY MEDICATION CHARGE: Performed by: INTERNAL MEDICINE

## 2024-01-18 RX ORDER — GUAIFENESIN 600 MG/1
600 TABLET, EXTENDED RELEASE ORAL EVERY 12 HOURS
Qty: 28 TABLET | Refills: 0 | Status: SHIPPED | OUTPATIENT
Start: 2024-01-18

## 2024-01-18 RX ORDER — AZITHROMYCIN 500 MG/1
500 TABLET, FILM COATED ORAL ONCE
Qty: 1 TABLET | Refills: 0 | Status: ACTIVE | OUTPATIENT
Start: 2024-01-18 | End: 2024-01-19

## 2024-01-18 RX ORDER — AMOXICILLIN AND CLAVULANATE POTASSIUM 875; 125 MG/1; MG/1
1 TABLET, FILM COATED ORAL 2 TIMES DAILY
Qty: 10 TABLET | Refills: 0 | Status: ACTIVE | OUTPATIENT
Start: 2024-01-18 | End: 2024-01-23

## 2024-01-18 RX ADMIN — GUAIFENESIN 600 MG: 600 TABLET, EXTENDED RELEASE ORAL at 05:28

## 2024-01-18 RX ADMIN — SENNOSIDES AND DOCUSATE SODIUM 2 TABLET: 50; 8.6 TABLET ORAL at 05:28

## 2024-01-18 ASSESSMENT — LIFESTYLE VARIABLES
DOES PATIENT WANT TO STOP DRINKING: NO
HAVE PEOPLE ANNOYED YOU BY CRITICIZING YOUR DRINKING: NO
EVER FELT BAD OR GUILTY ABOUT YOUR DRINKING: NO
ON A TYPICAL DAY WHEN YOU DRINK ALCOHOL HOW MANY DRINKS DO YOU HAVE: 1
HAVE YOU EVER FELT YOU SHOULD CUT DOWN ON YOUR DRINKING: NO
EVER HAD A DRINK FIRST THING IN THE MORNING TO STEADY YOUR NERVES TO GET RID OF A HANGOVER: NO
HOW MANY TIMES IN THE PAST YEAR HAVE YOU HAD 5 OR MORE DRINKS IN A DAY: 0
CONSUMPTION TOTAL: NEGATIVE
AVERAGE NUMBER OF DAYS PER WEEK YOU HAVE A DRINK CONTAINING ALCOHOL: 1
TOTAL SCORE: 0
ALCOHOL_USE: YES

## 2024-01-18 ASSESSMENT — PATIENT HEALTH QUESTIONNAIRE - PHQ9
SUM OF ALL RESPONSES TO PHQ9 QUESTIONS 1 AND 2: 0
2. FEELING DOWN, DEPRESSED, IRRITABLE, OR HOPELESS: NOT AT ALL

## 2024-01-18 ASSESSMENT — PAIN DESCRIPTION - PAIN TYPE: TYPE: ACUTE PAIN
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
## 2024-01-18 NOTE — PROGRESS NOTES
PT was brought down to discharge lounge. Pt's IV was removed. Pt did not have any questions regarding discharge. Pt states having all belongings. Pt is taking bus home. This RN verified pt has a bus pass

## 2024-01-18 NOTE — DISCHARGE INSTRUCTIONS
Discharge Instructions per Dr. Pola Herbert D.O.    DIET: Diet Order Diet: Regular    ACTIVITY: As tolerated    A proper diet that is low in grease, fat, and salt, along with 30 minutes of exercise per day will lead to weight loss, and better controlled blood sugar and blood pressure.    DIAGNOSIS: Pneumonia of left lower lobe due to infectious organism    Follow up with your Primary Care Provider Pcp as scheduled or sooner if your symptoms persist or worsen.  Return to Emergency Room for sever chest pain, shortness of breath, signs of a stroke, or any other emergencies.

## 2024-01-18 NOTE — PROGRESS NOTES
"Patient with complaint of headache 4/10 pain scale, aching and patient stated \"It feels like a sinus headache and Tylenol doesn't work for me at all\". Patient requesting for Advil or ibuprofen ans that what works for him. Patient is tachycardic between low 100s to 120s. Initial temperature at 8PM at 100.3F. Notified APRN hospitalist and ordered ibuprofen once. Patient on  to monitor HR. Patient is alert and oriented x4, able to communicate needs. Denies any shortness of breath. Will continue to monitor.  "
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
## 2024-01-18 NOTE — CARE PLAN
The patient is Stable - Low risk of patient condition declining or worsening    Shift Goals  Clinical Goals: Patient's pain will be <3 throughout shift  Patient Goals: Sleep and rest  Family Goals: ELISEO    Progress made toward(s) clinical / shift goals:      Problem: Pain - Standard  Goal: Alleviation of pain or a reduction in pain to the patient’s comfort goal  Description: Target End Date:  Prior to discharge or change in level of care    Document on Vitals flowsheet    1.  Document pain using the appropriate pain scale per order or unit policy  2.  Educate and implement non-pharmacologic comfort measures (i.e. relaxation, distraction, massage, cold/heat therapy, etc.)  3.  Pain management medications as ordered  4.  Reassess pain after pain med administration per policy  5.  If opiods administered assess patient's response to pain medication is appropriate per POSS sedation scale  6.  Follow pain management plan developed in collaboration with patient and interdisciplinary team (including palliative care or pain specialists if applicable)  1/18/2024 0158 by Keren Baumann V, R.N.  Outcome: Progressing  1/18/2024 0157 by Keren Baumann V, R.N.  Outcome: Progressing     Patient is alert and oriented x4, able to verbalize needs. Patient's pain is controlled with one dose of ibuprofen, cold pack, rest and repositioning. Patient is tachycardic in low 100s beginning of shift and was in normal range of 85-90s bpm since 1:00AM. Patient denies shortness of breath or dyspnea. Patient's ambulatory, bed in low position, call light within reach.

## 2024-01-18 NOTE — PROGRESS NOTES
"Patient refused any blood transfusion, notified APRN hospitalist Olga Benjamin, placed patient's \"No Blood\" armband on the right hand, added \"Bloodless\" on patient's allergy, patient doesn't have advance directives,thus  consult placed and will endorse the consent that needed to be signed by patient and doctor in the morning shift.   "

## 2024-01-19 LAB
BACTERIA UR CULT: NORMAL
SIGNIFICANT IND 70042: NORMAL
SITE SITE: NORMAL
SOURCE SOURCE: NORMAL

## 2024-09-11 ENCOUNTER — APPOINTMENT (OUTPATIENT)
Dept: RADIOLOGY | Facility: MEDICAL CENTER | Age: 54
DRG: 871 | End: 2024-09-11
Attending: STUDENT IN AN ORGANIZED HEALTH CARE EDUCATION/TRAINING PROGRAM
Payer: MEDICAID

## 2024-09-11 ENCOUNTER — HOSPITAL ENCOUNTER (INPATIENT)
Facility: MEDICAL CENTER | Age: 54
LOS: 1 days | DRG: 871 | End: 2024-09-13
Attending: STUDENT IN AN ORGANIZED HEALTH CARE EDUCATION/TRAINING PROGRAM | Admitting: INTERNAL MEDICINE
Payer: MEDICAID

## 2024-09-11 DIAGNOSIS — L03.116 CELLULITIS OF LEFT LOWER EXTREMITY: ICD-10-CM

## 2024-09-11 LAB
ALBUMIN SERPL BCP-MCNC: 3.4 G/DL (ref 3.2–4.9)
ALBUMIN/GLOB SERPL: 1 G/DL
ALP SERPL-CCNC: 108 U/L (ref 30–99)
ALT SERPL-CCNC: 10 U/L (ref 2–50)
ANION GAP SERPL CALC-SCNC: 13 MMOL/L (ref 7–16)
AST SERPL-CCNC: 14 U/L (ref 12–45)
BASOPHILS # BLD AUTO: 0.5 % (ref 0–1.8)
BASOPHILS # BLD: 0.04 K/UL (ref 0–0.12)
BILIRUB SERPL-MCNC: 0.6 MG/DL (ref 0.1–1.5)
BUN SERPL-MCNC: 10 MG/DL (ref 8–22)
CALCIUM ALBUM COR SERPL-MCNC: 8.7 MG/DL (ref 8.5–10.5)
CALCIUM SERPL-MCNC: 8.2 MG/DL (ref 8.5–10.5)
CHLORIDE SERPL-SCNC: 98 MMOL/L (ref 96–112)
CO2 SERPL-SCNC: 22 MMOL/L (ref 20–33)
CREAT SERPL-MCNC: 1.05 MG/DL (ref 0.5–1.4)
EOSINOPHIL # BLD AUTO: 0.01 K/UL (ref 0–0.51)
EOSINOPHIL NFR BLD: 0.1 % (ref 0–6.9)
ERYTHROCYTE [DISTWIDTH] IN BLOOD BY AUTOMATED COUNT: 40.5 FL (ref 35.9–50)
FLUAV RNA SPEC QL NAA+PROBE: NEGATIVE
FLUBV RNA SPEC QL NAA+PROBE: NEGATIVE
GFR SERPLBLD CREATININE-BSD FMLA CKD-EPI: 85 ML/MIN/1.73 M 2
GLOBULIN SER CALC-MCNC: 3.3 G/DL (ref 1.9–3.5)
GLUCOSE SERPL-MCNC: 105 MG/DL (ref 65–99)
HCT VFR BLD AUTO: 42.9 % (ref 42–52)
HGB BLD-MCNC: 14.8 G/DL (ref 14–18)
IMM GRANULOCYTES # BLD AUTO: 0.02 K/UL (ref 0–0.11)
IMM GRANULOCYTES NFR BLD AUTO: 0.3 % (ref 0–0.9)
LACTATE SERPL-SCNC: 1.4 MMOL/L (ref 0.5–2)
LYMPHOCYTES # BLD AUTO: 2.03 K/UL (ref 1–4.8)
LYMPHOCYTES NFR BLD: 25.4 % (ref 22–41)
MCH RBC QN AUTO: 29.2 PG (ref 27–33)
MCHC RBC AUTO-ENTMCNC: 34.5 G/DL (ref 32.3–36.5)
MCV RBC AUTO: 84.6 FL (ref 81.4–97.8)
MONOCYTES # BLD AUTO: 0.64 K/UL (ref 0–0.85)
MONOCYTES NFR BLD AUTO: 8 % (ref 0–13.4)
NEUTROPHILS # BLD AUTO: 5.24 K/UL (ref 1.82–7.42)
NEUTROPHILS NFR BLD: 65.7 % (ref 44–72)
NRBC # BLD AUTO: 0 K/UL
NRBC BLD-RTO: 0 /100 WBC (ref 0–0.2)
PLATELET # BLD AUTO: 228 K/UL (ref 164–446)
PMV BLD AUTO: 9.6 FL (ref 9–12.9)
POTASSIUM SERPL-SCNC: 4 MMOL/L (ref 3.6–5.5)
PROT SERPL-MCNC: 6.7 G/DL (ref 6–8.2)
RBC # BLD AUTO: 5.07 M/UL (ref 4.7–6.1)
RSV RNA SPEC QL NAA+PROBE: NEGATIVE
SARS-COV-2 RNA RESP QL NAA+PROBE: NOTDETECTED
SODIUM SERPL-SCNC: 133 MMOL/L (ref 135–145)
WBC # BLD AUTO: 8 K/UL (ref 4.8–10.8)

## 2024-09-11 PROCEDURE — 96375 TX/PRO/DX INJ NEW DRUG ADDON: CPT

## 2024-09-11 PROCEDURE — 73590 X-RAY EXAM OF LOWER LEG: CPT | Mod: LT

## 2024-09-11 PROCEDURE — 87483 CNS DNA AMP PROBE TYPE 12-25: CPT

## 2024-09-11 PROCEDURE — 99285 EMERGENCY DEPT VISIT HI MDM: CPT

## 2024-09-11 PROCEDURE — 700111 HCHG RX REV CODE 636 W/ 250 OVERRIDE (IP): Mod: UD | Performed by: STUDENT IN AN ORGANIZED HEALTH CARE EDUCATION/TRAINING PROGRAM

## 2024-09-11 PROCEDURE — 62270 DX LMBR SPI PNXR: CPT

## 2024-09-11 PROCEDURE — 700117 HCHG RX CONTRAST REV CODE 255: Mod: UD | Performed by: STUDENT IN AN ORGANIZED HEALTH CARE EDUCATION/TRAINING PROGRAM

## 2024-09-11 PROCEDURE — 84157 ASSAY OF PROTEIN OTHER: CPT

## 2024-09-11 PROCEDURE — 70450 CT HEAD/BRAIN W/O DYE: CPT

## 2024-09-11 PROCEDURE — 74177 CT ABD & PELVIS W/CONTRAST: CPT

## 2024-09-11 PROCEDURE — 83605 ASSAY OF LACTIC ACID: CPT

## 2024-09-11 PROCEDURE — 87205 SMEAR GRAM STAIN: CPT

## 2024-09-11 PROCEDURE — 87070 CULTURE OTHR SPECIMN AEROBIC: CPT

## 2024-09-11 PROCEDURE — 89051 BODY FLUID CELL COUNT: CPT

## 2024-09-11 PROCEDURE — 96365 THER/PROPH/DIAG IV INF INIT: CPT

## 2024-09-11 PROCEDURE — 96366 THER/PROPH/DIAG IV INF ADDON: CPT

## 2024-09-11 PROCEDURE — 0241U HCHG SARS-COV-2 COVID-19 NFCT DS RESP RNA 4 TRGT ED POC: CPT

## 2024-09-11 PROCEDURE — 700105 HCHG RX REV CODE 258: Mod: UD | Performed by: STUDENT IN AN ORGANIZED HEALTH CARE EDUCATION/TRAINING PROGRAM

## 2024-09-11 PROCEDURE — 87040 BLOOD CULTURE FOR BACTERIA: CPT | Mod: 91

## 2024-09-11 PROCEDURE — 85025 COMPLETE CBC W/AUTO DIFF WBC: CPT

## 2024-09-11 PROCEDURE — 36415 COLL VENOUS BLD VENIPUNCTURE: CPT

## 2024-09-11 PROCEDURE — 80053 COMPREHEN METABOLIC PANEL: CPT

## 2024-09-11 PROCEDURE — 82945 GLUCOSE OTHER FLUID: CPT

## 2024-09-11 PROCEDURE — 009U3ZX DRAINAGE OF SPINAL CANAL, PERCUTANEOUS APPROACH, DIAGNOSTIC: ICD-10-PCS | Performed by: STUDENT IN AN ORGANIZED HEALTH CARE EDUCATION/TRAINING PROGRAM

## 2024-09-11 PROCEDURE — 71045 X-RAY EXAM CHEST 1 VIEW: CPT

## 2024-09-11 RX ORDER — SODIUM CHLORIDE, SODIUM LACTATE, POTASSIUM CHLORIDE, CALCIUM CHLORIDE 600; 310; 30; 20 MG/100ML; MG/100ML; MG/100ML; MG/100ML
2000 INJECTION, SOLUTION INTRAVENOUS ONCE
Status: COMPLETED | OUTPATIENT
Start: 2024-09-11 | End: 2024-09-12

## 2024-09-11 RX ORDER — ACETAMINOPHEN 10 MG/ML
1000 INJECTION, SOLUTION INTRAVENOUS ONCE
Status: COMPLETED | OUTPATIENT
Start: 2024-09-11 | End: 2024-09-11

## 2024-09-11 RX ORDER — KETOROLAC TROMETHAMINE 15 MG/ML
15 INJECTION, SOLUTION INTRAMUSCULAR; INTRAVENOUS ONCE
Status: COMPLETED | OUTPATIENT
Start: 2024-09-11 | End: 2024-09-12

## 2024-09-11 RX ORDER — CEFTRIAXONE 2 G/1
2000 INJECTION, POWDER, FOR SOLUTION INTRAMUSCULAR; INTRAVENOUS ONCE
Status: COMPLETED | OUTPATIENT
Start: 2024-09-12 | End: 2024-09-12

## 2024-09-11 RX ORDER — LIDOCAINE HYDROCHLORIDE AND EPINEPHRINE BITARTRATE 20; .01 MG/ML; MG/ML
10 INJECTION, SOLUTION SUBCUTANEOUS ONCE
Status: COMPLETED | OUTPATIENT
Start: 2024-09-12 | End: 2024-09-12

## 2024-09-11 RX ADMIN — SODIUM CHLORIDE, POTASSIUM CHLORIDE, SODIUM LACTATE AND CALCIUM CHLORIDE 2000 ML: 600; 310; 30; 20 INJECTION, SOLUTION INTRAVENOUS at 22:28

## 2024-09-11 RX ADMIN — IOHEXOL 100 ML: 350 INJECTION, SOLUTION INTRAVENOUS at 23:08

## 2024-09-11 RX ADMIN — ACETAMINOPHEN 1000 MG: 10 INJECTION INTRAVENOUS at 22:27

## 2024-09-11 RX ADMIN — VANCOMYCIN HYDROCHLORIDE 2000 MG: 5 INJECTION, POWDER, LYOPHILIZED, FOR SOLUTION INTRAVENOUS at 22:29

## 2024-09-11 ASSESSMENT — FIBROSIS 4 INDEX: FIB4 SCORE: 0.83

## 2024-09-12 ENCOUNTER — APPOINTMENT (OUTPATIENT)
Dept: RADIOLOGY | Facility: MEDICAL CENTER | Age: 54
DRG: 871 | End: 2024-09-12
Payer: MEDICAID

## 2024-09-12 ENCOUNTER — APPOINTMENT (OUTPATIENT)
Dept: RADIOLOGY | Facility: MEDICAL CENTER | Age: 54
DRG: 871 | End: 2024-09-12
Attending: INTERNAL MEDICINE
Payer: MEDICAID

## 2024-09-12 PROBLEM — G93.40 ENCEPHALOPATHY ACUTE: Status: ACTIVE | Noted: 2024-09-12

## 2024-09-12 PROBLEM — L03.90 CELLULITIS: Status: ACTIVE | Noted: 2024-09-12

## 2024-09-12 PROBLEM — R51.9 HEADACHE: Status: ACTIVE | Noted: 2024-09-12

## 2024-09-12 LAB
AMMONIA PLAS-SCNC: 47 UMOL/L (ref 11–45)
AMPHET UR QL SCN: POSITIVE
APPEARANCE UR: CLEAR
BARBITURATES UR QL SCN: NEGATIVE
BASE EXCESS BLDV CALC-SCNC: -2 MMOL/L
BENZODIAZ UR QL SCN: NEGATIVE
BILIRUB UR QL STRIP.AUTO: NEGATIVE
BODY TEMPERATURE: 37.4 CENTIGRADE
BURR CELLS/RBC NFR CSF MANUAL: 0 %
BZE UR QL SCN: NEGATIVE
C GATTII+NEOFOR DNA CSF QL NAA+NON-PROBE: NOT DETECTED
CANNABINOIDS UR QL SCN: NEGATIVE
CK SERPL-CCNC: 105 U/L (ref 0–154)
CLARITY CSF: CLEAR
CMV DNA CSF QL NAA+NON-PROBE: NOT DETECTED
COLOR CSF: COLORLESS
COLOR SPUN CSF: COLORLESS
COLOR UR: YELLOW
CSF COMMENTS 1658: NORMAL
E COLI K1 DNA CSF QL NAA+NON-PROBE: NOT DETECTED
EKG IMPRESSION: NORMAL
EKG IMPRESSION: NORMAL
ETHANOL BLD-MCNC: <10.1 MG/DL
EV RNA CSF QL NAA+NON-PROBE: NOT DETECTED
FENTANYL UR QL: NEGATIVE
GLUCOSE BLD STRIP.AUTO-MCNC: 100 MG/DL (ref 65–99)
GLUCOSE BLD STRIP.AUTO-MCNC: 110 MG/DL (ref 65–99)
GLUCOSE BLD STRIP.AUTO-MCNC: 124 MG/DL (ref 65–99)
GLUCOSE BLD STRIP.AUTO-MCNC: 48 MG/DL (ref 65–99)
GLUCOSE BLD STRIP.AUTO-MCNC: 58 MG/DL (ref 65–99)
GLUCOSE BLD STRIP.AUTO-MCNC: 62 MG/DL (ref 65–99)
GLUCOSE BLD STRIP.AUTO-MCNC: 66 MG/DL (ref 65–99)
GLUCOSE BLD STRIP.AUTO-MCNC: 69 MG/DL (ref 65–99)
GLUCOSE BLD STRIP.AUTO-MCNC: 88 MG/DL (ref 65–99)
GLUCOSE BLD STRIP.AUTO-MCNC: 97 MG/DL (ref 65–99)
GLUCOSE BLD STRIP.AUTO-MCNC: 99 MG/DL (ref 65–99)
GLUCOSE CSF-MCNC: 63 MG/DL (ref 40–80)
GLUCOSE UR STRIP.AUTO-MCNC: NEGATIVE MG/DL
GP B STREP DNA CSF QL NAA+NON-PROBE: NOT DETECTED
GRAM STN SPEC: NORMAL
HAEM INFLU DNA CSF QL NAA+NON-PROBE: NOT DETECTED
HCO3 BLDV-SCNC: 26 MMOL/L (ref 24–28)
HHV6 DNA CSF QL NAA+NON-PROBE: NOT DETECTED
HSV1 DNA CSF QL NAA+NON-PROBE: NOT DETECTED
HSV2 DNA CSF QL NAA+NON-PROBE: NOT DETECTED
INHALED O2 FLOW RATE: ABNORMAL L/MIN
INR PPP: 1.11 (ref 0.87–1.13)
KETONES UR STRIP.AUTO-MCNC: NEGATIVE MG/DL
L MONOCYTOG DNA CSF QL NAA+NON-PROBE: NOT DETECTED
LACTATE SERPL-SCNC: 1.3 MMOL/L (ref 0.5–2)
LACTATE SERPL-SCNC: 2.2 MMOL/L (ref 0.5–2)
LACTATE SERPL-SCNC: 2.7 MMOL/L (ref 0.5–2)
LACTATE SERPL-SCNC: 3.6 MMOL/L (ref 0.5–2)
LACTATE SERPL-SCNC: 3.7 MMOL/L (ref 0.5–2)
LEUKOCYTE ESTERASE UR QL STRIP.AUTO: NEGATIVE
METHADONE UR QL SCN: NEGATIVE
MICRO URNS: ABNORMAL
N MEN DNA CSF QL NAA+NON-PROBE: NOT DETECTED
NITRITE UR QL STRIP.AUTO: NEGATIVE
NUC CELL # CSF: 1 CELLS/UL (ref 0–10)
OPIATES UR QL SCN: NEGATIVE
OXYCODONE UR QL SCN: NEGATIVE
PARECHOVIRUS A RNA CSF QL NAA+NON-PROBE: NOT DETECTED
PCO2 BLDV: 56.4 MMHG (ref 41–51)
PCO2 TEMP ADJ BLDV: 57.4 MMHG (ref 41–51)
PCP UR QL SCN: NEGATIVE
PH BLDV: 7.28 [PH] (ref 7.31–7.45)
PH TEMP ADJ BLDV: 7.27 [PH] (ref 7.31–7.45)
PH UR STRIP.AUTO: 6 [PH] (ref 5–8)
PO2 BLDV: 15.5 MMHG (ref 25–40)
PO2 TEMP ADJ BLDV: 15.9 MMHG (ref 25–40)
PROPOXYPH UR QL SCN: NEGATIVE
PROT CSF-MCNC: 46 MG/DL (ref 15–45)
PROT UR QL STRIP: NEGATIVE MG/DL
PROTHROMBIN TIME: 14.4 SEC (ref 12–14.6)
RBC # CSF: 2 CELLS/UL
RBC UR QL AUTO: NEGATIVE
S PNEUM DNA CSF QL NAA+NON-PROBE: NOT DETECTED
SAO2 % BLDV: 17.5 %
SCCMEC + MECA PNL NOSE NAA+PROBE: NEGATIVE
SIGNIFICANT IND 70042: NORMAL
SITE SITE: NORMAL
SOURCE SOURCE: NORMAL
SP GR UR STRIP.AUTO: >=1.045
SPECIMEN VOL CSF: 11 ML
TUBE # CSF: 4
TUBE # CSF: NORMAL
UROBILINOGEN UR STRIP.AUTO-MCNC: 1 MG/DL
VZV DNA CSF QL NAA+NON-PROBE: NOT DETECTED

## 2024-09-12 PROCEDURE — 93971 EXTREMITY STUDY: CPT | Mod: LT

## 2024-09-12 PROCEDURE — A9270 NON-COVERED ITEM OR SERVICE: HCPCS | Performed by: STUDENT IN AN ORGANIZED HEALTH CARE EDUCATION/TRAINING PROGRAM

## 2024-09-12 PROCEDURE — 51798 US URINE CAPACITY MEASURE: CPT

## 2024-09-12 PROCEDURE — 82140 ASSAY OF AMMONIA: CPT

## 2024-09-12 PROCEDURE — 97602 WOUND(S) CARE NON-SELECTIVE: CPT

## 2024-09-12 PROCEDURE — 82962 GLUCOSE BLOOD TEST: CPT | Mod: 91

## 2024-09-12 PROCEDURE — A9270 NON-COVERED ITEM OR SERVICE: HCPCS | Performed by: INTERNAL MEDICINE

## 2024-09-12 PROCEDURE — 96366 THER/PROPH/DIAG IV INF ADDON: CPT

## 2024-09-12 PROCEDURE — 96372 THER/PROPH/DIAG INJ SC/IM: CPT

## 2024-09-12 PROCEDURE — 82803 BLOOD GASES ANY COMBINATION: CPT

## 2024-09-12 PROCEDURE — 700105 HCHG RX REV CODE 258: Performed by: INTERNAL MEDICINE

## 2024-09-12 PROCEDURE — 80307 DRUG TEST PRSMV CHEM ANLYZR: CPT

## 2024-09-12 PROCEDURE — 93010 ELECTROCARDIOGRAM REPORT: CPT | Performed by: INTERNAL MEDICINE

## 2024-09-12 PROCEDURE — 700111 HCHG RX REV CODE 636 W/ 250 OVERRIDE (IP): Mod: JZ | Performed by: STUDENT IN AN ORGANIZED HEALTH CARE EDUCATION/TRAINING PROGRAM

## 2024-09-12 PROCEDURE — 36415 COLL VENOUS BLD VENIPUNCTURE: CPT

## 2024-09-12 PROCEDURE — 700105 HCHG RX REV CODE 258: Performed by: STUDENT IN AN ORGANIZED HEALTH CARE EDUCATION/TRAINING PROGRAM

## 2024-09-12 PROCEDURE — 700101 HCHG RX REV CODE 250: Mod: UD | Performed by: STUDENT IN AN ORGANIZED HEALTH CARE EDUCATION/TRAINING PROGRAM

## 2024-09-12 PROCEDURE — 99223 1ST HOSP IP/OBS HIGH 75: CPT | Mod: 25 | Performed by: INTERNAL MEDICINE

## 2024-09-12 PROCEDURE — 93005 ELECTROCARDIOGRAM TRACING: CPT | Performed by: INTERNAL MEDICINE

## 2024-09-12 PROCEDURE — 82077 ASSAY SPEC XCP UR&BREATH IA: CPT

## 2024-09-12 PROCEDURE — 87641 MR-STAPH DNA AMP PROBE: CPT

## 2024-09-12 PROCEDURE — 700111 HCHG RX REV CODE 636 W/ 250 OVERRIDE (IP): Mod: JZ | Performed by: NURSE PRACTITIONER

## 2024-09-12 PROCEDURE — 770006 HCHG ROOM/CARE - MED/SURG/GYN SEMI*

## 2024-09-12 PROCEDURE — 700111 HCHG RX REV CODE 636 W/ 250 OVERRIDE (IP): Mod: JZ,UD | Performed by: INTERNAL MEDICINE

## 2024-09-12 PROCEDURE — 85610 PROTHROMBIN TIME: CPT

## 2024-09-12 PROCEDURE — 700102 HCHG RX REV CODE 250 W/ 637 OVERRIDE(OP): Performed by: STUDENT IN AN ORGANIZED HEALTH CARE EDUCATION/TRAINING PROGRAM

## 2024-09-12 PROCEDURE — 81003 URINALYSIS AUTO W/O SCOPE: CPT

## 2024-09-12 PROCEDURE — 99406 BEHAV CHNG SMOKING 3-10 MIN: CPT | Performed by: INTERNAL MEDICINE

## 2024-09-12 PROCEDURE — 700102 HCHG RX REV CODE 250 W/ 637 OVERRIDE(OP): Performed by: INTERNAL MEDICINE

## 2024-09-12 PROCEDURE — 82550 ASSAY OF CK (CPK): CPT

## 2024-09-12 PROCEDURE — 700111 HCHG RX REV CODE 636 W/ 250 OVERRIDE (IP): Mod: JZ,UD | Performed by: STUDENT IN AN ORGANIZED HEALTH CARE EDUCATION/TRAINING PROGRAM

## 2024-09-12 PROCEDURE — 83605 ASSAY OF LACTIC ACID: CPT | Mod: 91

## 2024-09-12 PROCEDURE — 96375 TX/PRO/DX INJ NEW DRUG ADDON: CPT

## 2024-09-12 PROCEDURE — 87086 URINE CULTURE/COLONY COUNT: CPT

## 2024-09-12 RX ORDER — GAUZE BANDAGE 2" X 2"
100 BANDAGE TOPICAL DAILY
Status: DISCONTINUED | OUTPATIENT
Start: 2024-09-12 | End: 2024-09-12

## 2024-09-12 RX ORDER — MORPHINE SULFATE 4 MG/ML
2 INJECTION INTRAVENOUS
Status: DISCONTINUED | OUTPATIENT
Start: 2024-09-12 | End: 2024-09-13 | Stop reason: HOSPADM

## 2024-09-12 RX ORDER — LORAZEPAM 0.5 MG/1
0.5 TABLET ORAL EVERY 4 HOURS PRN
Status: DISCONTINUED | OUTPATIENT
Start: 2024-09-12 | End: 2024-09-13 | Stop reason: HOSPADM

## 2024-09-12 RX ORDER — PROMETHAZINE HYDROCHLORIDE 25 MG/1
12.5-25 SUPPOSITORY RECTAL EVERY 4 HOURS PRN
Status: DISCONTINUED | OUTPATIENT
Start: 2024-09-12 | End: 2024-09-13 | Stop reason: HOSPADM

## 2024-09-12 RX ORDER — SODIUM CHLORIDE, SODIUM LACTATE, POTASSIUM CHLORIDE, AND CALCIUM CHLORIDE .6; .31; .03; .02 G/100ML; G/100ML; G/100ML; G/100ML
250 INJECTION, SOLUTION INTRAVENOUS ONCE
Status: COMPLETED | OUTPATIENT
Start: 2024-09-13 | End: 2024-09-13

## 2024-09-12 RX ORDER — LORAZEPAM 2 MG/ML
1.5 INJECTION INTRAMUSCULAR
Status: DISCONTINUED | OUTPATIENT
Start: 2024-09-12 | End: 2024-09-13 | Stop reason: HOSPADM

## 2024-09-12 RX ORDER — KETOROLAC TROMETHAMINE 15 MG/ML
15 INJECTION, SOLUTION INTRAMUSCULAR; INTRAVENOUS ONCE
Status: COMPLETED | OUTPATIENT
Start: 2024-09-12 | End: 2024-09-12

## 2024-09-12 RX ORDER — ENOXAPARIN SODIUM 100 MG/ML
40 INJECTION SUBCUTANEOUS DAILY
Status: DISCONTINUED | OUTPATIENT
Start: 2024-09-12 | End: 2024-09-13 | Stop reason: HOSPADM

## 2024-09-12 RX ORDER — POLYETHYLENE GLYCOL 3350 17 G/17G
1 POWDER, FOR SOLUTION ORAL
Status: DISCONTINUED | OUTPATIENT
Start: 2024-09-12 | End: 2024-09-13 | Stop reason: HOSPADM

## 2024-09-12 RX ORDER — THIAMINE HYDROCHLORIDE 100 MG/ML
100 INJECTION, SOLUTION INTRAMUSCULAR; INTRAVENOUS DAILY
Status: DISCONTINUED | OUTPATIENT
Start: 2024-09-13 | End: 2024-09-12

## 2024-09-12 RX ORDER — FOLIC ACID 1 MG/1
1 TABLET ORAL DAILY
Status: DISCONTINUED | OUTPATIENT
Start: 2024-09-12 | End: 2024-09-13 | Stop reason: HOSPADM

## 2024-09-12 RX ORDER — LORAZEPAM 2 MG/ML
2 INJECTION INTRAMUSCULAR
Status: DISCONTINUED | OUTPATIENT
Start: 2024-09-12 | End: 2024-09-13 | Stop reason: HOSPADM

## 2024-09-12 RX ORDER — OXYCODONE HYDROCHLORIDE 5 MG/1
5 TABLET ORAL
Status: DISCONTINUED | OUTPATIENT
Start: 2024-09-12 | End: 2024-09-13 | Stop reason: HOSPADM

## 2024-09-12 RX ORDER — IBUPROFEN 200 MG
200 TABLET ORAL EVERY 12 HOURS PRN
Status: DISCONTINUED | OUTPATIENT
Start: 2024-09-12 | End: 2024-09-13 | Stop reason: HOSPADM

## 2024-09-12 RX ORDER — AMOXICILLIN 250 MG
2 CAPSULE ORAL EVERY EVENING
Status: DISCONTINUED | OUTPATIENT
Start: 2024-09-12 | End: 2024-09-13 | Stop reason: HOSPADM

## 2024-09-12 RX ORDER — LORAZEPAM 2 MG/ML
0.5 INJECTION INTRAMUSCULAR EVERY 4 HOURS PRN
Status: DISCONTINUED | OUTPATIENT
Start: 2024-09-12 | End: 2024-09-13 | Stop reason: HOSPADM

## 2024-09-12 RX ORDER — ACETAMINOPHEN 325 MG/1
650 TABLET ORAL EVERY 6 HOURS PRN
Status: DISCONTINUED | OUTPATIENT
Start: 2024-09-12 | End: 2024-09-13 | Stop reason: HOSPADM

## 2024-09-12 RX ORDER — ONDANSETRON 4 MG/1
4 TABLET, ORALLY DISINTEGRATING ORAL EVERY 4 HOURS PRN
Status: DISCONTINUED | OUTPATIENT
Start: 2024-09-12 | End: 2024-09-13 | Stop reason: HOSPADM

## 2024-09-12 RX ORDER — LABETALOL HYDROCHLORIDE 5 MG/ML
10 INJECTION, SOLUTION INTRAVENOUS EVERY 4 HOURS PRN
Status: DISCONTINUED | OUTPATIENT
Start: 2024-09-12 | End: 2024-09-13 | Stop reason: HOSPADM

## 2024-09-12 RX ORDER — LORAZEPAM 2 MG/1
4 TABLET ORAL
Status: DISCONTINUED | OUTPATIENT
Start: 2024-09-12 | End: 2024-09-13 | Stop reason: HOSPADM

## 2024-09-12 RX ORDER — OXYCODONE HYDROCHLORIDE 5 MG/1
2.5 TABLET ORAL
Status: DISCONTINUED | OUTPATIENT
Start: 2024-09-12 | End: 2024-09-13 | Stop reason: HOSPADM

## 2024-09-12 RX ORDER — GUAIFENESIN/DEXTROMETHORPHAN 100-10MG/5
10 SYRUP ORAL EVERY 6 HOURS PRN
Status: DISCONTINUED | OUTPATIENT
Start: 2024-09-12 | End: 2024-09-13 | Stop reason: HOSPADM

## 2024-09-12 RX ORDER — LORAZEPAM 1 MG/1
1 TABLET ORAL EVERY 4 HOURS PRN
Status: DISCONTINUED | OUTPATIENT
Start: 2024-09-12 | End: 2024-09-13 | Stop reason: HOSPADM

## 2024-09-12 RX ORDER — PROMETHAZINE HYDROCHLORIDE 25 MG/1
12.5-25 TABLET ORAL EVERY 4 HOURS PRN
Status: DISCONTINUED | OUTPATIENT
Start: 2024-09-12 | End: 2024-09-13 | Stop reason: HOSPADM

## 2024-09-12 RX ORDER — ONDANSETRON 2 MG/ML
4 INJECTION INTRAMUSCULAR; INTRAVENOUS EVERY 4 HOURS PRN
Status: DISCONTINUED | OUTPATIENT
Start: 2024-09-12 | End: 2024-09-13 | Stop reason: HOSPADM

## 2024-09-12 RX ORDER — DEXTROSE MONOHYDRATE 25 G/50ML
25 INJECTION, SOLUTION INTRAVENOUS
Status: DISCONTINUED | OUTPATIENT
Start: 2024-09-12 | End: 2024-09-13 | Stop reason: HOSPADM

## 2024-09-12 RX ORDER — NICOTINE 21 MG/24HR
21 PATCH, TRANSDERMAL 24 HOURS TRANSDERMAL
Status: DISCONTINUED | OUTPATIENT
Start: 2024-09-12 | End: 2024-09-13 | Stop reason: HOSPADM

## 2024-09-12 RX ORDER — LORAZEPAM 2 MG/ML
1 INJECTION INTRAMUSCULAR
Status: DISCONTINUED | OUTPATIENT
Start: 2024-09-12 | End: 2024-09-13 | Stop reason: HOSPADM

## 2024-09-12 RX ORDER — PROCHLORPERAZINE EDISYLATE 5 MG/ML
5-10 INJECTION INTRAMUSCULAR; INTRAVENOUS EVERY 4 HOURS PRN
Status: DISCONTINUED | OUTPATIENT
Start: 2024-09-12 | End: 2024-09-13 | Stop reason: HOSPADM

## 2024-09-12 RX ORDER — LORAZEPAM 2 MG/1
2 TABLET ORAL
Status: DISCONTINUED | OUTPATIENT
Start: 2024-09-12 | End: 2024-09-13 | Stop reason: HOSPADM

## 2024-09-12 RX ORDER — SODIUM CHLORIDE 9 MG/ML
INJECTION, SOLUTION INTRAVENOUS CONTINUOUS
Status: DISCONTINUED | OUTPATIENT
Start: 2024-09-12 | End: 2024-09-13 | Stop reason: HOSPADM

## 2024-09-12 RX ORDER — LABETALOL HYDROCHLORIDE 5 MG/ML
10 INJECTION, SOLUTION INTRAVENOUS ONCE
Status: COMPLETED | OUTPATIENT
Start: 2024-09-12 | End: 2024-09-12

## 2024-09-12 RX ORDER — LORAZEPAM 2 MG/ML
1 INJECTION INTRAMUSCULAR EVERY 4 HOURS PRN
Status: DISCONTINUED | OUTPATIENT
Start: 2024-09-12 | End: 2024-09-13 | Stop reason: HOSPADM

## 2024-09-12 RX ADMIN — SODIUM CHLORIDE: 9 INJECTION, SOLUTION INTRAVENOUS at 13:29

## 2024-09-12 RX ADMIN — LORAZEPAM 0.5 MG: 0.5 TABLET ORAL at 20:59

## 2024-09-12 RX ADMIN — LABETALOL HYDROCHLORIDE 10 MG: 5 INJECTION, SOLUTION INTRAVENOUS at 20:14

## 2024-09-12 RX ADMIN — FOLIC ACID 1 MG: 1 TABLET ORAL at 18:03

## 2024-09-12 RX ADMIN — LABETALOL HYDROCHLORIDE 10 MG: 5 INJECTION, SOLUTION INTRAVENOUS at 22:58

## 2024-09-12 RX ADMIN — ONDANSETRON 4 MG: 2 INJECTION INTRAMUSCULAR; INTRAVENOUS at 00:37

## 2024-09-12 RX ADMIN — IBUPROFEN 200 MG: 200 TABLET, FILM COATED ORAL at 13:18

## 2024-09-12 RX ADMIN — NICOTINE TRANSDERMAL SYSTEM 21 MG: 21 PATCH, EXTENDED RELEASE TRANSDERMAL at 06:09

## 2024-09-12 RX ADMIN — CEFTRIAXONE SODIUM 2000 MG: 2 INJECTION, POWDER, FOR SOLUTION INTRAMUSCULAR; INTRAVENOUS at 00:33

## 2024-09-12 RX ADMIN — THERA TABS 1 TABLET: TAB at 18:03

## 2024-09-12 RX ADMIN — NICOTINE POLACRILEX 2 MG: 2 GUM, CHEWING BUCCAL at 16:25

## 2024-09-12 RX ADMIN — AMPICILLIN AND SULBACTAM 3 G: 1; 2 INJECTION, POWDER, FOR SOLUTION INTRAMUSCULAR; INTRAVENOUS at 18:09

## 2024-09-12 RX ADMIN — ACETAMINOPHEN 650 MG: 325 TABLET ORAL at 20:57

## 2024-09-12 RX ADMIN — KETOROLAC TROMETHAMINE 15 MG: 15 INJECTION, SOLUTION INTRAMUSCULAR; INTRAVENOUS at 00:31

## 2024-09-12 RX ADMIN — ENOXAPARIN SODIUM 40 MG: 100 INJECTION SUBCUTANEOUS at 01:31

## 2024-09-12 RX ADMIN — LIDOCAINE HYDROCHLORIDE,EPINEPHRINE BITARTRATE 10 ML: 20; .01 INJECTION, SOLUTION INFILTRATION; PERINEURAL at 00:00

## 2024-09-12 RX ADMIN — VANCOMYCIN HYDROCHLORIDE 1000 MG: 5 INJECTION, POWDER, LYOPHILIZED, FOR SOLUTION INTRAVENOUS at 09:37

## 2024-09-12 RX ADMIN — Medication 100 MG: at 06:09

## 2024-09-12 RX ADMIN — KETOROLAC TROMETHAMINE 15 MG: 15 INJECTION, SOLUTION INTRAMUSCULAR; INTRAVENOUS at 22:55

## 2024-09-12 RX ADMIN — ACETAMINOPHEN 650 MG: 325 TABLET ORAL at 08:13

## 2024-09-12 RX ADMIN — ENOXAPARIN SODIUM 40 MG: 100 INJECTION SUBCUTANEOUS at 18:03

## 2024-09-12 RX ADMIN — SODIUM CHLORIDE: 9 INJECTION, SOLUTION INTRAVENOUS at 03:50

## 2024-09-12 SDOH — ECONOMIC STABILITY: TRANSPORTATION INSECURITY
IN THE PAST 12 MONTHS, HAS LACK OF RELIABLE TRANSPORTATION KEPT YOU FROM MEDICAL APPOINTMENTS, MEETINGS, WORK OR FROM GETTING THINGS NEEDED FOR DAILY LIVING?: NO

## 2024-09-12 SDOH — ECONOMIC STABILITY: TRANSPORTATION INSECURITY
IN THE PAST 12 MONTHS, HAS THE LACK OF TRANSPORTATION KEPT YOU FROM MEDICAL APPOINTMENTS OR FROM GETTING MEDICATIONS?: NO

## 2024-09-12 ASSESSMENT — PATIENT HEALTH QUESTIONNAIRE - PHQ9
SUM OF ALL RESPONSES TO PHQ9 QUESTIONS 1 AND 2: 0
2. FEELING DOWN, DEPRESSED, IRRITABLE, OR HOPELESS: NOT AT ALL
1. LITTLE INTEREST OR PLEASURE IN DOING THINGS: NOT AT ALL

## 2024-09-12 ASSESSMENT — LIFESTYLE VARIABLES
ORIENTATION AND CLOUDING OF SENSORIUM: DATE DISORIENTATION BY MORE THAN TWO CALENDAR DAYS
ANXIETY: NO ANXIETY (AT EASE)
HEADACHE, FULLNESS IN HEAD: NOT PRESENT
TREMOR: NO TREMOR
AUDITORY DISTURBANCES: NOT PRESENT
PAROXYSMAL SWEATS: BARELY PERCEPTIBLE SWEATING. PALMS MOIST
ORIENTATION AND CLOUDING OF SENSORIUM: ORIENTED AND CAN DO SERIAL ADDITIONS
HAVE YOU EVER FELT YOU SHOULD CUT DOWN ON YOUR DRINKING: NO
TOTAL SCORE: 0
SUBSTANCE_ABUSE: 0
AUDITORY DISTURBANCES: NOT PRESENT
PAROXYSMAL SWEATS: NO SWEAT VISIBLE
HEADACHE, FULLNESS IN HEAD: SEVERE
NAUSEA AND VOMITING: NO NAUSEA AND NO VOMITING
TOTAL SCORE: 4
NAUSEA AND VOMITING: NO NAUSEA AND NO VOMITING
EVER FELT BAD OR GUILTY ABOUT YOUR DRINKING: NO
TOTAL SCORE: 6
CONSUMPTION TOTAL: NEGATIVE
AGITATION: NORMAL ACTIVITY
VISUAL DISTURBANCES: NOT PRESENT
ALCOHOL_USE: YES
AVERAGE NUMBER OF DAYS PER WEEK YOU HAVE A DRINK CONTAINING ALCOHOL: 0
ANXIETY: NO ANXIETY (AT EASE)
TOTAL SCORE: 0
EVER HAD A DRINK FIRST THING IN THE MORNING TO STEADY YOUR NERVES TO GET RID OF A HANGOVER: NO
AGITATION: NORMAL ACTIVITY
HOW MANY TIMES IN THE PAST YEAR HAVE YOU HAD 5 OR MORE DRINKS IN A DAY: 0
TREMOR: TREMOR NOT VISIBLE BUT CAN BE FELT, FINGERTIP TO FINGERTIP
VISUAL DISTURBANCES: NOT PRESENT
ON A TYPICAL DAY WHEN YOU DRINK ALCOHOL HOW MANY DRINKS DO YOU HAVE: 0
HAVE PEOPLE ANNOYED YOU BY CRITICIZING YOUR DRINKING: NO
TOTAL SCORE: 0

## 2024-09-12 ASSESSMENT — COGNITIVE AND FUNCTIONAL STATUS - GENERAL
SUGGESTED CMS G CODE MODIFIER DAILY ACTIVITY: CH
MOVING TO AND FROM BED TO CHAIR: A LITTLE
MOVING FROM LYING ON BACK TO SITTING ON SIDE OF FLAT BED: A LITTLE
MOBILITY SCORE: 20
SUGGESTED CMS G CODE MODIFIER MOBILITY: CJ
STANDING UP FROM CHAIR USING ARMS: A LITTLE
TURNING FROM BACK TO SIDE WHILE IN FLAT BAD: A LITTLE
DAILY ACTIVITIY SCORE: 24

## 2024-09-12 ASSESSMENT — ENCOUNTER SYMPTOMS
SPUTUM PRODUCTION: 0
DOUBLE VISION: 0
BLURRED VISION: 0
TREMORS: 0
NAUSEA: 0
BRUISES/BLEEDS EASILY: 0
ORTHOPNEA: 0
VOMITING: 0
SPEECH CHANGE: 0
PALPITATIONS: 0
FOCAL WEAKNESS: 0
FLANK PAIN: 0
PHOTOPHOBIA: 0
COUGH: 1
HEMOPTYSIS: 0
NECK PAIN: 0
FEVER: 1
HEADACHES: 1
NERVOUS/ANXIOUS: 0
BACK PAIN: 0
HALLUCINATIONS: 0
HEARTBURN: 0
CHILLS: 1
WEIGHT LOSS: 0
POLYDIPSIA: 0

## 2024-09-12 ASSESSMENT — PAIN DESCRIPTION - PAIN TYPE
TYPE: ACUTE PAIN

## 2024-09-12 ASSESSMENT — SOCIAL DETERMINANTS OF HEALTH (SDOH)
WITHIN THE PAST 12 MONTHS, YOU WORRIED THAT YOUR FOOD WOULD RUN OUT BEFORE YOU GOT THE MONEY TO BUY MORE: SOMETIMES TRUE
WITHIN THE PAST 12 MONTHS, THE FOOD YOU BOUGHT JUST DIDN'T LAST AND YOU DIDN'T HAVE MONEY TO GET MORE: OFTEN TRUE
WITHIN THE LAST YEAR, HAVE YOU BEEN KICKED, HIT, SLAPPED, OR OTHERWISE PHYSICALLY HURT BY YOUR PARTNER OR EX-PARTNER?: NO
WITHIN THE LAST YEAR, HAVE TO BEEN RAPED OR FORCED TO HAVE ANY KIND OF SEXUAL ACTIVITY BY YOUR PARTNER OR EX-PARTNER?: NO
WITHIN THE LAST YEAR, HAVE YOU BEEN AFRAID OF YOUR PARTNER OR EX-PARTNER?: NO
WITHIN THE LAST YEAR, HAVE YOU BEEN HUMILIATED OR EMOTIONALLY ABUSED IN OTHER WAYS BY YOUR PARTNER OR EX-PARTNER?: NO

## 2024-09-12 NOTE — ASSESSMENT & PLAN NOTE
This is Sepsis Present on admission  SIRS criteria identified on my evaluation include: Fever, with temperature greater than 100.9 deg F and Tachycardia, with heart rate greater than 90 BPM  Clinical indicators of end organ dysfunction include Toxic Metabolic Encephalopathy and GCS < 15  Source is left leg cellulitis  Sepsis protocol initiated  Crystalloid Fluid Administration: Fluid resuscitation ordered per standard protocol - 30 mL/kg per current or ideal body weight  IV antibiotics as appropriate for source of sepsis  Reassessment: I have reassessed the patient's hemodynamic status

## 2024-09-12 NOTE — CARE PLAN
The patient is Stable - Low risk of patient condition declining or worsening    Shift Goals  Clinical Goals: Pt to recieve IV abx and monitor vitals  Patient Goals: Pt wants to rest and be updated with POC    Progress made toward(s) clinical / shift goals:      Pt continued to receive IV abx, performed q4 bladders scans, and fever managed with medication as needed per MAR. All needs met during shift and all questions answered. CIWA completed and no interventions required.      Problem: Pain - Standard  Goal: Alleviation of pain or a reduction in pain to the patient’s comfort goal  Outcome: Progressing  Note: Pt headache pain decreased during shift and pt declined.     Problem: Knowledge Deficit - Standard  Goal: Patient and family/care givers will demonstrate understanding of plan of care, disease process/condition, diagnostic tests and medications  Outcome: Progressing  Note: Pt updated with POC and verbally acknowledged.     Problem: Hemodynamics  Goal: Patient's hemodynamics, fluid balance and neurologic status will be stable or improve  Outcome: Progressing  Note: Pt continued to receive IV fluids during shift.     Problem: Fall Risk  Goal: Patient will remain free from falls  Outcome: Progressing  Note: Pt remained free from falls during shift.       Patient is not progressing towards the following goals:       5

## 2024-09-12 NOTE — PROGRESS NOTES
4 Eyes Skin Assessment Completed by SPENCER Head and SPENCER Meier.    Head WDL  Ears WDL  Nose WDL  Mouth WDL  Neck WDL  Breast/Chest WDL  Shoulder Blades WDL  Spine WDL  (R) Arm/Elbow/Hand WDL  (L) Arm/Elbow/Hand cyst on forearm  Abdomen WDL  Groin WDL  Scrotum/Coccyx/Buttocks WDL  (R) Leg Redness, Scar, and Abrasion  (L) Leg Redness and Scab, wound  (R) Heel/Foot/Toe Scab  (L) Heel/Foot/Toe WDL                        Devices In Places Pulse Ox      Interventions In Place Pillows and Pressure Redistribution Mattress    Possible Skin Injury Yes    Pictures Uploaded Into Epic Yes  Wound Consult Placed Yes  RN Wound Prevention Protocol Ordered No

## 2024-09-12 NOTE — WOUND TEAM
[facility]  Inpatient Services  Initial Wound and Skin Care Evaluation    Admission Date: 9/11/2024     Last order of IP CONSULT TO WOUND CARE was found on 9/12/2024 from Hospital Encounter on 9/11/2024     HPI, PMH, SH: Reviewed    Past Surgical History:   Procedure Laterality Date    IRRIGATION & DEBRIDEMENT ORTHO Right 4/21/2019    Procedure: IRRIGATION AND DEBRIDEMENT, WOUND;  Surgeon: Jayson Morrison M.D.;  Location: SURGERY [facility];  Service: Orthopedics    APPENDECTOMY LAPAROSCOPIC  2/5/2013    Performed by Elsa Rodrigez M.D. at SURGERY [facility]    CHOLECYSTECTOMY       Social History     Tobacco Use    Smoking status: Every Day     Current packs/day: 0.33     Types: Cigarettes    Smokeless tobacco: Never   Substance Use Topics    Alcohol use: Yes     Comment: 1 beer daily     Chief Complaint   Patient presents with    Fatigue     Pt BIBA after pt's spouse due to pt being lethargic. Pt slammed left leg in a car door 2 days ago causing an abrasion. Pt now has redness and swelling to L leg with tenderness. Pt complaining of headache, fever (101.6f) and tachycardic     Diagnosis: Cellulitis [L03.90]    Unit where seen by Wound Team: S524/01     WOUND CONSULT RELATED TO:  left lateral ankle cellulitis    WOUND TEAM PLAN OF CARE - Frequency of Follow-up:   Nursing to follow dressing orders written for wound care. Contact wound team if area fails to progress, deteriorates or with any questions/concerns if something comes up before next scheduled follow up (See below as to whether wound is following and frequency of wound follow up)   Not following, consult as needed  -  left lateral ankle cellulitis    WOUND HISTORY:      Fatigue       Pt BIBA after pt's spouse due to pt being lethargic. Pt slammed left leg in a car door 2 days ago causing an abrasion. Pt now has redness and swelling to L leg with tenderness. Pt complaining of headache, fever (101.6f) and tachycardic          History of Presenting Illness  Dio Shaver is a 54 y.o. male with history of hypertension, smoking, alcohol use, amphetamine use, homelessness who presented 9/11/2024 with complaints of left calf swelling, redness that developed after abrasion sustained 2 days ago.  Additional complaints include dry cough, generalized weakness and bilateral flank pain, and diffuse headache and lethargy in the last 2 days.  Temperature 101.6 F, heart rate 123, on room air.  Patient undergone LP to rule out meningitis due to headache.  Total protein slightly elevated to 46, no pleocytosis.  PCR for meningitis/encephalitis pending.  Blood gas does not elevated.  CT abdomen pelvis with contrast: No acute abnormalities.  CT head without contrast no acute abnormalities.  Chest x-ray: No acute abnormalities.  Patient was started on ceftriaxone and vancomycin to cover for cellulitis and potential meningitis.       WOUND ASSESSMENT/LDA  Wound 09/12/24 Traumatic Ankle Lateral Left (Active)   Date First Assessed: 09/12/24   Present on Original Admission: Yes  Hand Hygiene Completed: Yes  Primary Wound Type: Traumatic  Location: Ankle  Wound Orientation: Lateral  Laterality: Left      Assessments 9/12/2024 11:00 AM   Wound Image      Site Assessment Pink;Red;Yellow;Drainage;Edema;Granulation tissue;Slough;Crusted   Periwound Assessment Clean;Dry;Intact;Warm;Blanchable erythema;Edema   Margins Attached edges;Defined edges   Closure Adhesive bandage   Drainage Amount Scant   Drainage Description Serous   Treatments Cleansed;Nonselective debridement;Site care;Offloading   Wound Cleansing Approved Wound Cleanser   Periwound Protectant No-sting Skin Prep   Dressing Status Clean;Dry;Intact   Dressing Changed New   Dressing Cleansing/Solutions Not Applicable   Dressing Options Hydrofera Blue Ready;Silicone Adhesive Foam   Dressing Change/Treatment Frequency Every 48 hrs, and As Needed   NEXT Dressing Change/Treatment Date 09/14/24   NEXT Weekly  Photo (Inpatient Only) 09/18/24   Wound Team Following Not following   Non-staged Wound Description Full thickness   Wound Length (cm) 2 cm   Wound Width (cm) 1.4 cm   Wound Depth (cm) 0.1 cm   Wound Surface Area (cm^2) 2.8 cm^2   Wound Volume (cm^3) 0.28 cm^3   Wound Bed Granulation (%) 90 %   Wound Bed Slough (%) 10 %   Shape oval   Wound Odor None   Pulses Left;2+;DP;PT   Exposed Structures None   WOUND NURSE ONLY - Time Spent with Patient (mins) 20        Vascular:    MIKE:   No results found.    Lab Values:    Lab Results   Component Value Date/Time    WBC 8.0 09/11/2024 09:49 PM    RBC 5.07 09/11/2024 09:49 PM    HEMOGLOBIN 14.8 09/11/2024 09:49 PM    HEMATOCRIT 42.9 09/11/2024 09:49 PM         Culture Results show:  No results found for this or any previous visit (from the past 720 hour(s)).    Pain Level/Medicated:  None, Tolerated without pain medication       INTERVENTIONS BY WOUND TEAM:  Chart and images reviewed. Discussed with bedside RN. All areas of concern (based on picture review, LDA review and discussion with bedside RN) have been thoroughly assessed. Documentation of areas based on significant findings. This RN in to assess patient. Performed standard wound care which includes appropriate positioning, dressing removal and non-selective debridement. Pictures and measurements obtained weekly if/when required.    Wound:  Left lateral ankle   Preparation for Dressing removal: Open to air  Cleansed/Non-selectively Debrided with:  Wound cleanser and Gauze  Fatuma wound: Cleansed with Wound cleanser and Gauze, Prepped with No Sting  Primary Dressing:  hydrofera blue ready  Secondary (Outer) Dressing: silicone adhesive dressing    Advanced Wound Care Discharge Planning  Number of Clinicians necessary to complete wound care: 1  Is patient requiring IV pain medications for dressing changes:  No   Length of time for dressing change 20 min. (This does not include chart review, pre-medication time, set up, clean  up or time spent charting.)    Interdisciplinary consultation: Patient, Bedside RN     EVALUATION / RATIONALE FOR TREATMENT:     Date:  09/12/24  Wound Status:  Initial evaluation    Patient with full thickness wound to left lateral ankle from hitting leg on car door. Confirmed cellulitis to left lower leg, IV abx in place. Hydrofera Blue applied for the hydrophilic polyurethane foam which contains ethylene oxide used as a bactericidal, fungicidal, and sporicidal disinfectant. Hydrofera Blue also aids in maintaining a moist wound environment. The absorption properties of this dressing are important in collecting exudates and bacteria from the injured area. These harmful fluid secretions bind to the dressing removing it from the wound without the foam sticking to the wound causing more harm.      Goals: Steady decrease in wound area and depth weekly.    NURSING PLAN OF CARE ORDERS:  Dressing changes: See Dressing Care orders    NUTRITION RECOMMENDATIONS   Wound Team Recommendations:  N/A    DIET ORDERS (From admission to next 24h)       Start     Ordered    09/12/24 0012  Diet Order Diet: Regular  ALL MEALS        Question:  Diet:  Answer:  Regular    09/12/24 0014                    PREVENTATIVE INTERVENTIONS:    Q shift Chau - performed per nursing policy  Q shift pressure point assessments - performed per nursing policy    Offloading/Redistribution  Offloading dressing in place    Anticipated discharge plans:  Self/Family Care        Vac Discharge Needs:  Vac Discharge plan is purely a recommendation from wound team and not a requirement for discharge unless otherwise stated by physician.  Not Applicable Pt not on a wound vac

## 2024-09-12 NOTE — DIETARY
Nutrition Services:   Day 0 of admit.  Dio Shaver is a 54 y.o. male with admitting DX of Cellulitis.    Pt seen by RD due to comment in admit nutrition screen that he takes Boost at home. Pt denies intake of Boost. He reports a good appetite. Pt is receiving a Regular diet.     Pt does report an allergy to tuna which causes a rash. Pt denied allergy to any other fish or shellfish. Allergy to tuna entered in allergies.    RD to monitor per department policy.

## 2024-09-12 NOTE — H&P
Hospital Medicine History & Physical Note    Date of Service  9/12/2024    Primary Care Physician  Pcp Pt States None      Code Status  Full Code    Chief Complaint  Chief Complaint   Patient presents with    Fatigue     Pt BIBA after pt's spouse due to pt being lethargic. Pt slammed left leg in a car door 2 days ago causing an abrasion. Pt now has redness and swelling to L leg with tenderness. Pt complaining of headache, fever (101.6f) and tachycardic       History of Presenting Illness  Dio Shaver is a 54 y.o. male with history of hypertension, smoking, alcohol use, amphetamine use, homelessness who presented 9/11/2024 with complaints of left calf swelling, redness that developed after abrasion sustained 2 days ago.  Additional complaints include dry cough, generalized weakness and bilateral flank pain, and diffuse headache and lethargy in the last 2 days.  Temperature 101.6 F, heart rate 123, on room air.  Patient undergone LP to rule out meningitis due to headache.  Total protein slightly elevated to 46, no pleocytosis.  PCR for meningitis/encephalitis pending.  Blood gas does not elevated.  CT abdomen pelvis with contrast: No acute abnormalities.  CT head without contrast no acute abnormalities.  Chest x-ray: No acute abnormalities.  Patient was started on ceftriaxone and vancomycin to cover for cellulitis and potential meningitis.      I discussed the plan of care with patient and ERP .    Review of Systems  Review of Systems   Constitutional:  Positive for chills, fever and malaise/fatigue. Negative for weight loss.   HENT:  Negative for ear pain, hearing loss and tinnitus.    Eyes:  Negative for blurred vision, double vision and photophobia.   Respiratory:  Positive for cough. Negative for hemoptysis and sputum production.    Cardiovascular:  Negative for chest pain, palpitations and orthopnea.   Gastrointestinal:  Negative for heartburn, nausea and vomiting.   Genitourinary:  Negative for dysuria,  flank pain, frequency and hematuria.   Musculoskeletal:  Positive for joint pain. Negative for back pain and neck pain.   Skin:  Negative for itching and rash.   Neurological:  Positive for headaches. Negative for tremors, speech change and focal weakness.   Endo/Heme/Allergies:  Negative for environmental allergies and polydipsia. Does not bruise/bleed easily.   Psychiatric/Behavioral:  Negative for hallucinations and substance abuse. The patient is not nervous/anxious.        Past Medical History   has a past medical history of Hypertension.    Surgical History   has a past surgical history that includes cholecystectomy; appendectomy laparoscopic (2/5/2013); and irrigation & debridement ortho (Right, 4/21/2019).     Family History  family history is not on file.   Family history reviewed with patient. There is no family history that is pertinent to the chief complaint.     Social History   reports that he has been smoking cigarettes. He has never used smokeless tobacco. He reports current alcohol use. He reports that he does not use drugs.    Allergies  Allergies   Allergen Reactions    Bloodless        Medications  Prior to Admission Medications   Prescriptions Last Dose Informant Patient Reported? Taking?   guaiFENesin ER (MUCINEX) 600 MG TABLET SR 12 HR   No No   Sig: Take 1 Tablet by mouth every 12 hours.   ibuprofen (MOTRIN) 200 MG Tab  Patient Yes No   Sig: Take 200-400 mg by mouth 2 times a day as needed for Mild Pain.      Facility-Administered Medications: None       Physical Exam  Temp:  [38.7 °C (101.6 °F)] 38.7 °C (101.6 °F)  Pulse:  [116-123] 116  Resp:  [16-24] 18  BP: (154-156)/(71-92) 154/90  SpO2:  [93 %-96 %] 93 %  Blood Pressure: (!) 154/90   Temperature: (!) 38.7 °C (101.6 °F)   Pulse: (!) 116   Respiration: 18   Pulse Oximetry: 93 %       Physical Exam  Vitals and nursing note reviewed.   Constitutional:       General: He is not in acute distress.     Appearance: Normal appearance.       "Comments: Disheveled   HENT:      Head: Normocephalic and atraumatic.      Nose: Nose normal.      Mouth/Throat:      Mouth: Mucous membranes are moist.   Eyes:      Extraocular Movements: Extraocular movements intact.      Pupils: Pupils are equal, round, and reactive to light.   Cardiovascular:      Rate and Rhythm: Normal rate and regular rhythm.   Pulmonary:      Effort: Pulmonary effort is normal.      Breath sounds: Normal breath sounds.   Abdominal:      General: Abdomen is flat. There is no distension.      Tenderness: There is no abdominal tenderness.   Musculoskeletal:         General: No swelling or deformity. Normal range of motion.      Cervical back: Normal range of motion and neck supple.      Comments:  2 cm area of ulceration on left posterior calf with surrounding induration, erythema and warmth   Skin:     General: Skin is warm and dry.   Neurological:      General: No focal deficit present.      Mental Status: He is lethargic.      GCS: GCS eye subscore is 2. GCS verbal subscore is 5. GCS motor subscore is 4.   Psychiatric:         Mood and Affect: Mood normal.         Behavior: Behavior normal.         Laboratory:  Recent Labs     09/11/24  2149   WBC 8.0   RBC 5.07   HEMOGLOBIN 14.8   HEMATOCRIT 42.9   MCV 84.6   MCH 29.2   MCHC 34.5   RDW 40.5   PLATELETCT 228   MPV 9.6     Recent Labs     09/11/24  2149   SODIUM 133*   POTASSIUM 4.0   CHLORIDE 98   CO2 22   GLUCOSE 105*   BUN 10   CREATININE 1.05   CALCIUM 8.2*     Recent Labs     09/11/24  2149   ALTSGPT 10   ASTSGOT 14   ALKPHOSPHAT 108*   TBILIRUBIN 0.6   GLUCOSE 105*         No results for input(s): \"NTPROBNP\" in the last 72 hours.      No results for input(s): \"TROPONINT\" in the last 72 hours.    Imaging:  DX-TIBIA AND FIBULA LEFT   Final Result      No radiographic evidence of acute traumatic injury.      CT-ABDOMEN-PELVIS WITH   Final Result      1.  No acute abnormality of the abdomen or pelvis.   2.  Status post cholecystectomy.    "   CT-HEAD W/O   Final Result      No acute intracranial abnormality.               DX-CHEST-PORTABLE (1 VIEW)   Final Result      No acute cardiopulmonary disease evident.          X-Ray:  I have personally reviewed the images and compared with prior images.    Assessment/Plan:  Justification for Admission Status  I anticipate this patient will require at least two midnights for appropriate medical management, necessitating inpatient admission because sepsis    Patient will need a Med/Surg bed on EMERGENCY service .  The need is secondary to sepsis.    * Cellulitis of left lower extremity- (present on admission)  Assessment & Plan  54-year-old male presented with left lower extremity swelling and redness after ambulation today's exam, leukocytosis, tachycardia, fever, lethargy  Plan to continue ceftriaxone and vancomycin  Ultrasound DVT study to rule out DVT  Monitor clinically on a daily basis  Pain control    Encephalopathy acute  Assessment & Plan  Metabolic toxic, secondary to sepsis and likely toxic ingestions  LP basic study is not consistent with meningitis or encephalitis, PCR panel is pending  CT head without contrast: Negative  Plan: To check bladder scan for retention, ammonia, TSH, urine drug screen, alcohol  Fall, aspiration, seizure precautions    Headache  Assessment & Plan  Evaluated with CT head that is negative for acute process  Evaluated with LP which did not show pleocytosis, protein slightly elevated at 46  COVID-19 influenza screen negative  Tylenol, oxycodone as needed    Methamphetamine abuse (HCC)- (present on admission)  Assessment & Plan  Cessation counseling    Tobacco abuse- (present on admission)  Assessment & Plan  I discussed smoking cessation  with Mr. Shaver for 4 minutes.  Discussion included potential harm to use lungs, risk of stroke and heart attack associated with smoking, as well as the ways to assist him with smoking cessation.  Ordered nicotine patch  Code 20541      Sepsis  (HCC)- (present on admission)  Assessment & Plan  This is Sepsis Present on admission  SIRS criteria identified on my evaluation include: Fever, with temperature greater than 100.9 deg F and Tachycardia, with heart rate greater than 90 BPM  Clinical indicators of end organ dysfunction include Toxic Metabolic Encephalopathy and GCS < 15  Source is left leg cellulitis  Sepsis protocol initiated  Crystalloid Fluid Administration: Fluid resuscitation ordered per standard protocol - 30 mL/kg per current or ideal body weight  IV antibiotics as appropriate for source of sepsis  Reassessment: I have reassessed the patient's hemodynamic status        VTE prophylaxis: enoxaparin ppx with

## 2024-09-12 NOTE — ED NOTES
Lab at bedside for cultures. Report from SPENCER Ferguson. POC discussed. Patient remains on monitoring.

## 2024-09-12 NOTE — ASSESSMENT & PLAN NOTE
I discussed smoking cessation  with Mr. Shaver for 4 minutes.  Discussion included potential harm to use lungs, risk of stroke and heart attack associated with smoking, as well as the ways to assist him with smoking cessation.  Ordered nicotine patch  Code 09273

## 2024-09-12 NOTE — ED TRIAGE NOTES
"Chief Complaint   Patient presents with    Fatigue     Pt BIBA after pt's spouse due to pt being lethargic. Pt slammed left leg in a car door 2 days ago causing an abrasion. Pt now has redness and swelling to L leg with tenderness. Pt complaining of headache, fever (101.6f) and tachycardic         Pt presents to ER for above complaint. Pt A+Ox4, GCS 15 but lethargic. Pt reporting headache, neck pain and BL flank pain. Pt has abrasion to L ankle that's red and irritated.       BP (!) 156/92   Pulse (!) 123   Temp (!) 38.7 °C (101.6 °F) (Temporal)   Resp (!) 24   Ht 1.803 m (5' 11\")   Wt 83.9 kg (185 lb)   SpO2 94%      "

## 2024-09-12 NOTE — PROGRESS NOTES
Received report and assumed care of patient at change of shift. Patient is A&Ox4 and lethargic. On RA, and reports headache 7/10 at this time. Patient assessment completed, bed in lowest position, and call light and personal belongings are within reach. Patient expressed no further needs at this time. Plan for pt is to monitor Lactic acid, vitals, and urine output.

## 2024-09-12 NOTE — ED NOTES
Bedside report received from off going RN/tech: Rosa, assumed care of patient.  POC discussed with patient. Call light within reach, all needs addressed at this time.       Fall risk interventions in place: Patient's personal possessions are with in their safe reach, Place socks on patient, Place fall risk sign on patient's door, Give patient urinal if applicable, and Keep floor surfaces clean and dry (all applicable per [place] Fall risk assessment)   Continuous monitoring: Cardiac Leads, Pulse Ox, or Blood Pressure  IVF/IV medications: Infusion per MAR (List Med(s)) vancomycin   Oxygen: Room Air  Bedside sitter: Not Applicable   Isolation: Not Applicable

## 2024-09-12 NOTE — ASSESSMENT & PLAN NOTE
Evaluated with CT head that is negative for acute process  Evaluated with LP which did not show pleocytosis, protein slightly elevated at 46  COVID-19 influenza screen negative  Tylenol, oxycodone as needed

## 2024-09-12 NOTE — PROGRESS NOTES
Pharmacy Vancomycin Kinetics Note for 9/12/2024     54 y.o. male on Vancomycin day # 1     Vancomycin Indication (AUC Dosing): Skin/skin structure infection    Provider specified end date: 09/18/24    Active Antibiotics (From admission, onward)      Ordered     Ordering Provider       Thu Sep 12, 2024  1:01 AM    09/12/24 0101  vancomycin (Vancocin) 1,000 mg in  mL IVPB  (vancomycin (VANCOCIN) IV (LD + Maintenance))  EVERY 12 HOURS        Note to Pharmacy: Per P&T vanco per pharmacy Protocol    Prosper Nice M.D.       Thu Sep 12, 2024 12:14 AM    09/12/24 0014  cefTRIAXone (Rocephin) syringe 2,000 mg  EVERY 24 HOURS         Prosper Nice M.D.    09/12/24 0014  MD Alert...Vancomycin per Pharmacy  PHARMACY TO DOSE        Question:  Indication(s) for vancomycin?  Answer:  Skin and soft tissue infection    Prosper Nice M.D.       Wed Sep 11, 2024 11:31 PM    09/11/24 2331  cefTRIAXone (Rocephin) injection 2,000 mg  ONCE         Juni Holloway D.O.       Wed Sep 11, 2024 10:08 PM    09/11/24 2208  vancomycin (Vancocin) 2,000 mg in  mL IVPB  (vancomycin (VANCOCIN) IV (LD + Maintenance))  ONCE        Note to Pharmacy: Per P&T vanco per pharmacy Protocol    Juni Holloway D.O.            Dosing Weight: 83.9 kg (184 lb 15.5 oz)      Admission History: Admitted on 9/11/2024 for Cellulitis [L03.90]  Pertinent history: Patient presenting with fatigue and fever after left leg abrasion 2 days ago. Empiric CTX and vanco therapy initiated.    Allergies:     Bloodless     Pertinent cultures to date:     Results       Procedure Component Value Units Date/Time    CSF CULTURE [[id number]] Collected: 09/11/24 2359    Order Status: Sent Specimen: CSF from Tap Updated: 09/12/24 0041    BLOOD CULTURE [[id number]] Collected: 09/11/24 2217    Order Status: Sent Specimen: Blood from Peripheral Updated: 09/11/24 2308    BLOOD CULTURE [[id number]] Collected: 09/11/24 2217    Order Status: Sent Specimen:  "Blood from Peripheral Updated: 24 2308    MRSA By PCR (Amp) [[id number]]     Order Status: Sent Specimen: Respirate from Nares     Urinalysis [[id number]]     Order Status: Sent Specimen: Urine     Urine Culture (New) [[id number]]     Order Status: Sent Specimen: Urine     Blood Culture - Draw one from central line and one from peripheral site [[id number]]     Order Status: Sent Specimen: Blood from Peripheral     Blood Culture - Draw one from central line and one from peripheral site [[id number]]     Order Status: Sent Specimen: Blood from Line             Labs:     Estimated Creatinine Clearance: 86.7 mL/min (by C-G formula based on SCr of 1.05 mg/dL).  Recent Labs     24  2149   WBC 8.0   NEUTSPOLYS 65.70     Recent Labs     24   BUN 10   CREATININE 1.05   ALBUMIN 3.4     No intake or output data in the 24 hours ending 24 0101   BP (!) 156/83   Pulse (!) 104   Temp (!) 38.7 °C (101.6 °F) (Temporal)   Resp 20   Ht 1.803 m (5' 11\")   Wt 83.9 kg (185 lb)   SpO2 94%  Temp (24hrs), Av.7 °C (101.6 °F), Min:38.7 °C (101.6 °F), Max:38.7 °C (101.6 °F)      List concerns for Vancomycin clearance:     Receipt of contrast dye    Pharmacokinetics:     AUC kinetics:   Ke (hr ^-1): 0.0764 hr^-1  Half life: 9.07 hr  Clearance: 4.166  Estimated TDD:   Estimated Dose: 963  Estimated interval: 11.1    A/P:     -  Vancomycin dose: 2000mg loading dose followed by 1000mg Q12h    -  Next vancomycin level(s):    - None ordered at this time. Likely obtain levels after the 4th maintenance dose unless clinical status or renal function changes.     -  Predicted vancomycin AUC from initial AUC test calculator: 480 mg·hr/L     -  Comments: Concerns for accumulation listed above. MRSA nares swab ordered. Pharmacy will continue to follow and adjust therapy as clinically appropriate.      Reese Minor, PharmD    "

## 2024-09-12 NOTE — ASSESSMENT & PLAN NOTE
Metabolic toxic, secondary to sepsis and likely toxic ingestions  LP basic study is not consistent with meningitis or encephalitis, PCR panel is pending  CT head without contrast: Negative  Plan: To check bladder scan for retention, ammonia, TSH, urine drug screen, alcohol  Fall, aspiration, seizure precautions

## 2024-09-12 NOTE — PROGRESS NOTES
Patient admitted after midnight with encephalopath,  left ankle cellulitis.  Meth use.  Details of please refer to H&P    I saw and examined the patient at bedside  Aox4, still drowsy    He reported daily drinking, half bottle of whiskey (around 300 -400cc), last drink was the day before admission; reported history of alcohol withdrawal but no seizure   Reported smokes cigarettes half pack per day.  He denies recent meth use, however, his urine drug screen is positive for amphetamine    CT abdomen no acute pathology  CT head no acute pathology    Tachycardia, hypertension -likely due to meth use, infection.  Avoid beta-blocker.  I ordered Ativan as needed.     Left ankle cellulitis - wbc 8, recently injury, no fracture on x-ray.  Continue Unasyn.  MRSA screen negative, I discontinued vancomycin    Fever -continue IV antibiotics.      Lactic acidosis -lactic acid trending up.  Likely type II lactic acidosis.  I ordered IV thiamine.     Alcohol abuse -I ordered a [facility] protocol    Ammonia 47

## 2024-09-12 NOTE — PROGRESS NOTES
Assumed care of patient at 0210. Received report from ED RN Kika. Patient A&Ox4, very lethargic, on RA, declines pain. Call light within reach, belongings within reach, fall precautions in place, bed in lowest position, bed alarm on. Patient does not have any other needs at this time. Significant other Marcy present at bedside. POC was discussed with patient.

## 2024-09-12 NOTE — CARE PLAN
The patient is Watcher - Medium risk of patient condition declining or worsening    Shift Goals  Clinical Goals: Pt to receive IV abx and continuous fluids.  Patient Goals: Rest, snacks    Progress made toward(s) clinical / shift goals:    Pt received one dose of IV vanco and is receiving continuous infusion of NS at 100 mL/hr. Pt resting with no complaints of pain. Pt able to void in urinal to collect urine samples. Pt calls appropriately for assistance.    Problem: Pain - Standard  Goal: Alleviation of pain or a reduction in pain to the patient’s comfort goal  Outcome: Progressing     Problem: Knowledge Deficit - Standard  Goal: Patient and family/care givers will demonstrate understanding of plan of care, disease process/condition, diagnostic tests and medications  Outcome: Progressing     Problem: Hemodynamics  Goal: Patient's hemodynamics, fluid balance and neurologic status will be stable or improve  Outcome: Progressing     Problem: Urinary - Renal Perfusion  Goal: Ability to achieve and maintain adequate renal perfusion and functioning will improve  Outcome: Progressing     Problem: Fall Risk  Goal: Patient will remain free from falls  Outcome: Progressing       Patient is not progressing towards the following goals:

## 2024-09-12 NOTE — DISCHARGE PLANNING
Care Transition Team Assessment    SW met with patient at bedside. Patient appeared AAOX4.  Patient confirmed demographic information in the Face Sheet is accurate.  Patient reports he lives alone at [facility].  Patient reports his only support is carlos Vidal [phone].  Patient's reported income is $2,300.00 / monthly.  Patient is not established with PCP. Patient denies owning DME.  Based on the above information; the anticipated discharge home is Home with O/P Services.    Information Source  Orientation Level: Oriented X4  Information Given By: Patient  Informant's Name: Dio  Who is responsible for making decisions for patient? : Patient    Readmission Evaluation  Is this a readmission?: No    Elopement Risk  Legal Hold: No  Ambulatory or Self Mobile in Wheelchair: Yes  Disoriented: No  Psychiatric Symptoms: None  History of Wandering: No  Elopement this Admit: No  Vocalizing Wanting to Leave: No  Displays Behaviors, Body Language Wanting to Leave: No-Not at Risk for Elopement  Elopement Risk: Not at Risk for Elopement    Interdisciplinary Discharge Planning  Lives with - Patient's Self Care Capacity: Significant Other  Patient or legal guardian wants to designate a caregiver: No  Support Systems: Spouse / Significant Other  Housing / Facility: Homeless (Living in car)    Discharge Preparedness  What is your plan after discharge?: Home with help  What are your discharge supports?: Other (comment)  Prior Functional Level: Independent with Activities of Daily Living, Independent with Medication Management    Functional Assessment  Prior Functional Level: Independent with Activities of Daily Living, Independent with Medication Management    Finances  Financial Barriers to Discharge: No  Prescription Coverage: Yes    Vision / Hearing Impairment  Vision Impairment : No  Hearing Impairment : No         Advance Directive  Advance Directive?: None  Advance Directive offered?: AD Booklet  refused    Domestic Abuse  Have you ever been the victim of abuse or violence?: No  Possible Abuse/Neglect Reported to:: Not Applicable    Psychological Assessment  History of Substance Abuse: None  History of Psychiatric Problems: No    Discharge Risks or Barriers  Discharge risks or barriers?: Lives alone, no community support, Complex medical needs, Other (comment)  Patient risk factors: Complex medical needs    Anticipated Discharge Information  Discharge Disposition: Discharged to home/self care (01)  Discharge Address: [facility]  Discharge Contact Phone Number: emily [phone]

## 2024-09-12 NOTE — ASSESSMENT & PLAN NOTE
54-year-old male presented with left lower extremity swelling and redness after ambulation today's exam, leukocytosis, tachycardia, fever, lethargy  Plan to continue ceftriaxone and vancomycin  Ultrasound DVT study to rule out DVT  Monitor clinically on a daily basis  Pain control

## 2024-09-12 NOTE — ED NOTES
Medication history reviewed with patients spouse at bedside.   Med rec is complete  Allergies reviewed.     Patient has not had any outpatient antibiotics in the last 30 days.   Anticoagulants: No    Cristina Sosa

## 2024-09-12 NOTE — ED PROVIDER NOTES
ED Provider Note    CHIEF COMPLAINT  Chief Complaint   Patient presents with    Fatigue     Pt BIBA after pt's spouse due to pt being lethargic. Pt slammed left leg in a car door 2 days ago causing an abrasion. Pt now has redness and swelling to L leg with tenderness. Pt complaining of headache, fever (101.6f) and tachycardic       EXTERNAL RECORDS REVIEWED  Inpatient Notes discharge summary from 1/18/2024 patient noted to be homeless tobacco user, urine drug screen positive for methamphetamines treated for pneumonia    HPI/ROS  LIMITATION TO HISTORY     OUTSIDE HISTORIAN(S):  Significant other at bedside    Dio Shaver is a 54 y.o. male who presents with fatigue, body aches and fever.  Patient says that 2 days ago he struck his left leg on a car door and since then has had redness pain swelling along posterior aspect of his left calf.  Patient says that yesterday he developed fever, fatigue body aches headache, neck pain or back pain.  Patient says that he has pain  along both of his flanks.  Patient says he has been eating drinking less than usual.  Patient says he has had mild nonproductive cough, denies chest pain or shortness of breath, vomiting, diarrhea, bloody stools or changes in urination.  Patient endorses regular alcohol use but denies recent drug use.  Patient denies extremity weakness numbness, urinary or stool incontinence.    PAST MEDICAL HISTORY   has a past medical history of Hypertension.    SURGICAL HISTORY   has a past surgical history that includes cholecystectomy; appendectomy laparoscopic (2/5/2013); and irrigation & debridement ortho (Right, 4/21/2019).    FAMILY HISTORY  No family history on file.    SOCIAL HISTORY  Social History     Tobacco Use    Smoking status: Every Day     Current packs/day: 0.33     Types: Cigarettes    Smokeless tobacco: Never   Substance and Sexual Activity    Alcohol use: Yes     Comment: 1 beer daily    Drug use: No    Sexual activity: Not on file  "      CURRENT MEDICATIONS  Home Medications       Reviewed by Cristina Sosa (Pharmacy Tech) on 09/12/24 at 0021  Med List Status: Complete     Medication Last Dose Status        Patient Jean Taking any Medications                         Audit from Redirected Encounters    **Home medications have not yet been reviewed for this encounter**         ALLERGIES  Allergies   Allergen Reactions    Bloodless        PHYSICAL EXAM  VITAL SIGNS: /71   Pulse 89   Temp 36.4 °C (97.5 °F) (Temporal)   Resp 17   Ht 1.803 m (5' 11\")   Wt 83.9 kg (185 lb)   SpO2 96%   BMI 25.80 kg/m²    Constitutional: Ill-appearing  HENT: No signs of trauma, Bilateral external ears normal, Nose normal.   Eyes: Pupils are equal and reactive, Conjunctiva normal, Non-icteric.   Neck: Normal range of motion, No tenderness, Supple, No stridor  Cardiovascular: Tachycardic  Thorax & Lungs: Normal breath sounds, No respiratory distress, No wheezing, No chest tenderness.   Abdomen: Bowel sounds normal, Soft, No tenderness, No masses, No pulsatile masses.   Skin: Warm, Dry, No erythema, No rash.   Back: No midline bony tenderness overlying skin changes, bilateral CVA tenderness  Extremities: Approximate 2 cm area of ulceration on left posterior calf with surrounding induration, erythema and warmth, soft compartments throughout bilateral lower extremities intact distal pulses, No edema, No tenderness, No cyanosis  Musculoskeletal: Good range of motion in all major joints. No tenderness to palpation or major deformities noted.   Neurologic: Alert , Normal motor function, Normal sensory function, No focal deficits noted, 5/5 strength in bilateral lower extremities, no saddle anesthesia, no facial asymmetry, normal speech      EKG/LABS  Labs Reviewed   COMP METABOLIC PANEL - Abnormal; Notable for the following components:       Result Value    Sodium 133 (*)     Glucose 105 (*)     Calcium 8.2 (*)     Alkaline Phosphatase 108 (*)     All other " components within normal limits   CSF PROTEIN - Abnormal; Notable for the following components:    Total Protein, CSF 46 (*)     All other components within normal limits   POCT GLUCOSE DEVICE RESULTS - Abnormal; Notable for the following components:    POC Glucose, Blood 110 (*)     All other components within normal limits   LACTIC ACID   CBC WITH DIFFERENTIAL   BLOOD CULTURE   BLOOD CULTURE   ESTIMATED GFR   CSF CELL COUNT   MENINGITIS/ENCEPHALITIS CSF PANEL BY PCR   CSF GLUCOSE   GRAM STAIN   URINALYSIS   URINE CULTURE(NEW)   BLOOD CULTURE   BLOOD CULTURE   MRSA BY PCR (AMP)   CSF CULTURE   URINE DRUG SCREEN   AMMONIA   DIAGNOSTIC ALCOHOL   CREATINE KINASE   LACTIC ACID   PROTHROMBIN TIME   POCT COV-2, FLU A/B, RSV BY PCR   POC COV-2, FLU A/B, RSV BY PCR         RADIOLOGY/PROCEDURES   I have independently interpreted the diagnostic imaging associated with this visit and am waiting the final reading from the radiologist.   My preliminary interpretation is as follows: No intracranial hemorrhage    Radiologist interpretation:  DX-TIBIA AND FIBULA LEFT   Final Result      No radiographic evidence of acute traumatic injury.      CT-ABDOMEN-PELVIS WITH   Final Result      1.  No acute abnormality of the abdomen or pelvis.   2.  Status post cholecystectomy.      CT-HEAD W/O   Final Result      No acute intracranial abnormality.               DX-CHEST-PORTABLE (1 VIEW)   Final Result      No acute cardiopulmonary disease evident.      US-EXTREMITY VENOUS LOWER UNILAT LEFT    (Results Pending)       COURSE & MEDICAL DECISION MAKING    ASSESSMENT, COURSE AND PLAN  Care Narrative: On arrival patient noted to be febrile, tachycardic, tachypneic, ill-appearing.  Patient does have signs of cellulitis slight ulceration across his left lower extremity.  Given patient's systemic symptoms including fatigue body ache consider possibility of bacteremia patient's previously had MRSA will obtain blood cultures.  Patient has normal  neurologic exam.  Will obtain CT head to assess for intra hemorrhage or occult injury as not appear to be a reliable historian.  Believe left lower extremity cellulitis very likely be source of patient's fever prior history of methamphetamine abuse did consider possibility of osteomyelitis spine, epidural abscess or meningitis but believe these are less likely.  Will assess patient's response to IV fluids, analgesics consider need for lumbar puncture.  With patient's bilateral flank pain consider intra-abdominal abscess, pyelonephritis, nephrolithiasis will obtain CT imaging.  Have initiated vancomycin based on patient's prior MRSA infections and clear signs of cellulitis.  Will also obtain viral panel.  Patient has positive SIRS criteria and clear source of infection cellulitis suspect he may be septic will obtain blood work to assess for signs of endorgan disease or severe sepsis.    CT head is unremarkable.  CT of the abdomen pelvis without acute abnormality.  Reassessment patient with ongoing tachycardia appears to be lethargic but otherwise fully oriented with a nonfocal neurologic exam.  Patient complains of ongoing headache and neck pain.  Speaking with the patient's partner patient complained primarily of headache all day yesterday after developing fever.  Given this additional history patient's ongoing headache and lethargy felt it necessary to assess for meningitis by lumbar puncture.  After discussion with patient and his partner consented for procedure.  Performed lumbar puncture as noted below.  Broaden coverage to include ceftriaxone.  Spoke with hospitalist regarding admission for ongoing management of cellulitis, possible sepsis and rule out meningitis.    Lumbar Puncture Procedure Note    Indication: to obtain spinal fluid for diagnostic testing    Consent: The patient was counseled regarding the procedure, it's indications, risks, potential complications and alternatives and any questions were  answered. Consent was obtained.    Procedure: The patient was placed in the left lateral recumbent position and the appropriate landmarks were identified. The area was prepped and draped in the usual sterile fashion. Anesthesia was obtained using 5 cc of 2% Lidocaine with epinephrine. A 22-gauge atraumatic spinal needle was inserted at the L4- L5 level with the stylet in place until spinal fluid was returned. Opening pressure was not measured. At this point 8 ml of clear fluid was obtained and sent for appropriate testing. The stylet was then replaced and the needle was withdrawn. A sterile dressing was placed over the site and the patient was placed in the supine position.    The patient tolerated the procedure well.    Complications: None                ADDITIONAL PROBLEMS MANAGED      DISPOSITION AND DISCUSSIONS  I have discussed management of the patient with the following physicians and IZZY's: Hospitalist      FINAL DIAGNOSIS  1. Cellulitis of left lower extremity         Electronically signed by: Juni Holloway D.O., 9/11/2024 9:57 PM

## 2024-09-13 ENCOUNTER — HOSPITAL ENCOUNTER (OUTPATIENT)
Dept: RADIOLOGY | Facility: MEDICAL CENTER | Age: 54
End: 2024-09-13
Payer: MEDICAID

## 2024-09-13 ENCOUNTER — APPOINTMENT (OUTPATIENT)
Dept: CARDIOLOGY | Facility: MEDICAL CENTER | Age: 54
DRG: 871 | End: 2024-09-13
Attending: STUDENT IN AN ORGANIZED HEALTH CARE EDUCATION/TRAINING PROGRAM
Payer: MEDICAID

## 2024-09-13 VITALS
RESPIRATION RATE: 18 BRPM | TEMPERATURE: 97.7 F | DIASTOLIC BLOOD PRESSURE: 87 MMHG | HEIGHT: 71 IN | SYSTOLIC BLOOD PRESSURE: 144 MMHG | WEIGHT: 185 LBS | BODY MASS INDEX: 25.9 KG/M2 | HEART RATE: 104 BPM | OXYGEN SATURATION: 95 %

## 2024-09-13 LAB
ALBUMIN SERPL BCP-MCNC: 3.1 G/DL (ref 3.2–4.9)
ALBUMIN/GLOB SERPL: 1.1 G/DL
ALP SERPL-CCNC: 87 U/L (ref 30–99)
ALT SERPL-CCNC: 17 U/L (ref 2–50)
AMMONIA PLAS-SCNC: 36 UMOL/L (ref 11–45)
ANION GAP SERPL CALC-SCNC: 11 MMOL/L (ref 7–16)
AST SERPL-CCNC: 18 U/L (ref 12–45)
B PARAP IS1001 DNA NPH QL NAA+NON-PROBE: NOT DETECTED
B PERT.PT PRMT NPH QL NAA+NON-PROBE: NOT DETECTED
BASOPHILS # BLD AUTO: 0.5 % (ref 0–1.8)
BASOPHILS # BLD: 0.03 K/UL (ref 0–0.12)
BILIRUB SERPL-MCNC: 0.4 MG/DL (ref 0.1–1.5)
BUN SERPL-MCNC: 10 MG/DL (ref 8–22)
C PNEUM DNA NPH QL NAA+NON-PROBE: NOT DETECTED
CALCIUM ALBUM COR SERPL-MCNC: 8.7 MG/DL (ref 8.5–10.5)
CALCIUM SERPL-MCNC: 8 MG/DL (ref 8.5–10.5)
CHLORIDE SERPL-SCNC: 105 MMOL/L (ref 96–112)
CO2 SERPL-SCNC: 20 MMOL/L (ref 20–33)
CREAT SERPL-MCNC: 0.93 MG/DL (ref 0.5–1.4)
EOSINOPHIL # BLD AUTO: 0.02 K/UL (ref 0–0.51)
EOSINOPHIL NFR BLD: 0.4 % (ref 0–6.9)
ERYTHROCYTE [DISTWIDTH] IN BLOOD BY AUTOMATED COUNT: 39.5 FL (ref 35.9–50)
FLUAV RNA NPH QL NAA+NON-PROBE: NOT DETECTED
FLUBV RNA NPH QL NAA+NON-PROBE: NOT DETECTED
GFR SERPLBLD CREATININE-BSD FMLA CKD-EPI: 98 ML/MIN/1.73 M 2
GLOBULIN SER CALC-MCNC: 2.8 G/DL (ref 1.9–3.5)
GLUCOSE BLD STRIP.AUTO-MCNC: 138 MG/DL (ref 65–99)
GLUCOSE BLD STRIP.AUTO-MCNC: 88 MG/DL (ref 65–99)
GLUCOSE SERPL-MCNC: 132 MG/DL (ref 65–99)
HADV DNA NPH QL NAA+NON-PROBE: DETECTED
HCOV 229E RNA NPH QL NAA+NON-PROBE: NOT DETECTED
HCOV HKU1 RNA NPH QL NAA+NON-PROBE: NOT DETECTED
HCOV NL63 RNA NPH QL NAA+NON-PROBE: NOT DETECTED
HCOV OC43 RNA NPH QL NAA+NON-PROBE: NOT DETECTED
HCT VFR BLD AUTO: 36.6 % (ref 42–52)
HGB BLD-MCNC: 12.9 G/DL (ref 14–18)
HMPV RNA NPH QL NAA+NON-PROBE: NOT DETECTED
HPIV1 RNA NPH QL NAA+NON-PROBE: NOT DETECTED
HPIV2 RNA NPH QL NAA+NON-PROBE: NOT DETECTED
HPIV3 RNA NPH QL NAA+NON-PROBE: NOT DETECTED
HPIV4 RNA NPH QL NAA+NON-PROBE: NOT DETECTED
IMM GRANULOCYTES # BLD AUTO: 0.02 K/UL (ref 0–0.11)
IMM GRANULOCYTES NFR BLD AUTO: 0.4 % (ref 0–0.9)
LACTATE SERPL-SCNC: 1.4 MMOL/L (ref 0.5–2)
LIPASE SERPL-CCNC: 35 U/L (ref 11–82)
LYMPHOCYTES # BLD AUTO: 2.24 K/UL (ref 1–4.8)
LYMPHOCYTES NFR BLD: 39.2 % (ref 22–41)
M PNEUMO DNA NPH QL NAA+NON-PROBE: NOT DETECTED
MAGNESIUM SERPL-MCNC: 1.5 MG/DL (ref 1.5–2.5)
MCH RBC QN AUTO: 29.8 PG (ref 27–33)
MCHC RBC AUTO-ENTMCNC: 35.2 G/DL (ref 32.3–36.5)
MCV RBC AUTO: 84.5 FL (ref 81.4–97.8)
MONOCYTES # BLD AUTO: 0.66 K/UL (ref 0–0.85)
MONOCYTES NFR BLD AUTO: 11.6 % (ref 0–13.4)
NEUTROPHILS # BLD AUTO: 2.74 K/UL (ref 1.82–7.42)
NEUTROPHILS NFR BLD: 47.9 % (ref 44–72)
NRBC # BLD AUTO: 0 K/UL
NRBC BLD-RTO: 0 /100 WBC (ref 0–0.2)
PHOSPHATE SERPL-MCNC: 3.1 MG/DL (ref 2.5–4.5)
PLATELET # BLD AUTO: 176 K/UL (ref 164–446)
PMV BLD AUTO: 9.7 FL (ref 9–12.9)
POTASSIUM SERPL-SCNC: 3.3 MMOL/L (ref 3.6–5.5)
PROCALCITONIN SERPL-MCNC: 0.08 NG/ML
PROT SERPL-MCNC: 5.9 G/DL (ref 6–8.2)
RBC # BLD AUTO: 4.33 M/UL (ref 4.7–6.1)
RSV RNA NPH QL NAA+NON-PROBE: NOT DETECTED
RV+EV RNA NPH QL NAA+NON-PROBE: NOT DETECTED
SARS-COV-2 RNA NPH QL NAA+NON-PROBE: NOTDETECTED
SODIUM SERPL-SCNC: 136 MMOL/L (ref 135–145)
TROPONIN T SERPL-MCNC: 28 NG/L (ref 6–19)
WBC # BLD AUTO: 5.7 K/UL (ref 4.8–10.8)

## 2024-09-13 PROCEDURE — 700105 HCHG RX REV CODE 258

## 2024-09-13 PROCEDURE — 83735 ASSAY OF MAGNESIUM: CPT

## 2024-09-13 PROCEDURE — 83605 ASSAY OF LACTIC ACID: CPT

## 2024-09-13 PROCEDURE — 85025 COMPLETE CBC W/AUTO DIFF WBC: CPT

## 2024-09-13 PROCEDURE — 700102 HCHG RX REV CODE 250 W/ 637 OVERRIDE(OP): Performed by: INTERNAL MEDICINE

## 2024-09-13 PROCEDURE — 74018 RADEX ABDOMEN 1 VIEW: CPT

## 2024-09-13 PROCEDURE — 84484 ASSAY OF TROPONIN QUANT: CPT

## 2024-09-13 PROCEDURE — 0202U NFCT DS 22 TRGT SARS-COV-2: CPT

## 2024-09-13 PROCEDURE — 84100 ASSAY OF PHOSPHORUS: CPT

## 2024-09-13 PROCEDURE — 700105 HCHG RX REV CODE 258: Performed by: INTERNAL MEDICINE

## 2024-09-13 PROCEDURE — A9270 NON-COVERED ITEM OR SERVICE: HCPCS | Performed by: STUDENT IN AN ORGANIZED HEALTH CARE EDUCATION/TRAINING PROGRAM

## 2024-09-13 PROCEDURE — 700102 HCHG RX REV CODE 250 W/ 637 OVERRIDE(OP): Performed by: STUDENT IN AN ORGANIZED HEALTH CARE EDUCATION/TRAINING PROGRAM

## 2024-09-13 PROCEDURE — 82140 ASSAY OF AMMONIA: CPT

## 2024-09-13 PROCEDURE — 700105 HCHG RX REV CODE 258: Performed by: STUDENT IN AN ORGANIZED HEALTH CARE EDUCATION/TRAINING PROGRAM

## 2024-09-13 PROCEDURE — 700111 HCHG RX REV CODE 636 W/ 250 OVERRIDE (IP)

## 2024-09-13 PROCEDURE — 82962 GLUCOSE BLOOD TEST: CPT

## 2024-09-13 PROCEDURE — 36415 COLL VENOUS BLD VENIPUNCTURE: CPT

## 2024-09-13 PROCEDURE — 84145 PROCALCITONIN (PCT): CPT

## 2024-09-13 PROCEDURE — 700111 HCHG RX REV CODE 636 W/ 250 OVERRIDE (IP): Mod: JZ | Performed by: STUDENT IN AN ORGANIZED HEALTH CARE EDUCATION/TRAINING PROGRAM

## 2024-09-13 PROCEDURE — 80053 COMPREHEN METABOLIC PANEL: CPT

## 2024-09-13 PROCEDURE — A9270 NON-COVERED ITEM OR SERVICE: HCPCS | Performed by: INTERNAL MEDICINE

## 2024-09-13 PROCEDURE — 99239 HOSP IP/OBS DSCHRG MGMT >30: CPT | Performed by: STUDENT IN AN ORGANIZED HEALTH CARE EDUCATION/TRAINING PROGRAM

## 2024-09-13 PROCEDURE — 83690 ASSAY OF LIPASE: CPT

## 2024-09-13 PROCEDURE — 71045 X-RAY EXAM CHEST 1 VIEW: CPT

## 2024-09-13 RX ORDER — POTASSIUM CHLORIDE 1500 MG/1
40 TABLET, EXTENDED RELEASE ORAL ONCE
Status: COMPLETED | OUTPATIENT
Start: 2024-09-13 | End: 2024-09-13

## 2024-09-13 RX ORDER — LOSARTAN POTASSIUM 50 MG/1
50 TABLET ORAL
Status: DISCONTINUED | OUTPATIENT
Start: 2024-09-13 | End: 2024-09-13 | Stop reason: HOSPADM

## 2024-09-13 RX ORDER — MAGNESIUM SULFATE HEPTAHYDRATE 40 MG/ML
2 INJECTION, SOLUTION INTRAVENOUS ONCE
Status: COMPLETED | OUTPATIENT
Start: 2024-09-13 | End: 2024-09-13

## 2024-09-13 RX ADMIN — AMPICILLIN AND SULBACTAM 3 G: 1; 2 INJECTION, POWDER, FOR SOLUTION INTRAMUSCULAR; INTRAVENOUS at 05:03

## 2024-09-13 RX ADMIN — THERA TABS 1 TABLET: TAB at 04:57

## 2024-09-13 RX ADMIN — POTASSIUM CHLORIDE 40 MEQ: 1500 TABLET, EXTENDED RELEASE ORAL at 08:17

## 2024-09-13 RX ADMIN — SODIUM CHLORIDE: 9 INJECTION, SOLUTION INTRAVENOUS at 04:08

## 2024-09-13 RX ADMIN — ACETAMINOPHEN 650 MG: 325 TABLET ORAL at 04:56

## 2024-09-13 RX ADMIN — AMPICILLIN AND SULBACTAM 3 G: 1; 2 INJECTION, POWDER, FOR SOLUTION INTRAMUSCULAR; INTRAVENOUS at 00:57

## 2024-09-13 RX ADMIN — NICOTINE POLACRILEX 2 MG: 2 GUM, CHEWING BUCCAL at 08:34

## 2024-09-13 RX ADMIN — NICOTINE TRANSDERMAL SYSTEM 21 MG: 21 PATCH, EXTENDED RELEASE TRANSDERMAL at 05:30

## 2024-09-13 RX ADMIN — THIAMINE HYDROCHLORIDE 250 MG: 100 INJECTION, SOLUTION INTRAMUSCULAR; INTRAVENOUS at 01:43

## 2024-09-13 RX ADMIN — SODIUM CHLORIDE, POTASSIUM CHLORIDE, SODIUM LACTATE AND CALCIUM CHLORIDE 250 ML: 600; 310; 30; 20 INJECTION, SOLUTION INTRAVENOUS at 00:49

## 2024-09-13 RX ADMIN — MAGNESIUM SULFATE HEPTAHYDRATE 2 G: 2 INJECTION, SOLUTION INTRAVENOUS at 08:23

## 2024-09-13 RX ADMIN — FOLIC ACID 1 MG: 1 TABLET ORAL at 04:56

## 2024-09-13 RX ADMIN — LOSARTAN POTASSIUM 50 MG: 50 TABLET, FILM COATED ORAL at 08:17

## 2024-09-13 ASSESSMENT — LIFESTYLE VARIABLES
VISUAL DISTURBANCES: NOT PRESENT
TREMOR: NO TREMOR
ANXIETY: NO ANXIETY (AT EASE)
AUDITORY DISTURBANCES: NOT PRESENT
NAUSEA AND VOMITING: NO NAUSEA AND NO VOMITING
TOTAL SCORE: 2
AUDITORY DISTURBANCES: NOT PRESENT
NAUSEA AND VOMITING: NO NAUSEA AND NO VOMITING
HEADACHE, FULLNESS IN HEAD: MODERATE
HEADACHE, FULLNESS IN HEAD: MILD
PAROXYSMAL SWEATS: BARELY PERCEPTIBLE SWEATING. PALMS MOIST
AGITATION: NORMAL ACTIVITY
AGITATION: NORMAL ACTIVITY
HEADACHE, FULLNESS IN HEAD: MILD
PAROXYSMAL SWEATS: NO SWEAT VISIBLE
PAROXYSMAL SWEATS: NO SWEAT VISIBLE
VISUAL DISTURBANCES: NOT PRESENT
TOTAL SCORE: 3
ORIENTATION AND CLOUDING OF SENSORIUM: ORIENTED AND CAN DO SERIAL ADDITIONS
ANXIETY: MILDLY ANXIOUS
AGITATION: NORMAL ACTIVITY
ORIENTATION AND CLOUDING OF SENSORIUM: ORIENTED AND CAN DO SERIAL ADDITIONS
ORIENTATION AND CLOUDING OF SENSORIUM: ORIENTED AND CAN DO SERIAL ADDITIONS
TOTAL SCORE: 4
TREMOR: NO TREMOR
VISUAL DISTURBANCES: NOT PRESENT
NAUSEA AND VOMITING: NO NAUSEA AND NO VOMITING
TREMOR: NO TREMOR
ANXIETY: NO ANXIETY (AT EASE)
AUDITORY DISTURBANCES: NOT PRESENT

## 2024-09-13 ASSESSMENT — PAIN DESCRIPTION - PAIN TYPE
TYPE: ACUTE PAIN
TYPE: ACUTE PAIN

## 2024-09-13 NOTE — PROGRESS NOTES
Pt BP and HR elevated per flowsheet, pt complaining of chest pressure/pain, with a fever of 102.5. Hospitalist notified and orders followed.

## 2024-09-13 NOTE — PROGRESS NOTES
NOC HOSPITALIST CROSS COVER    Notified by RN regarding patient had been complaining of chest pain/shortness of breath earlier in the shift, for which prior cross cover had ordered EKG.  EKG demonstrated sinus rhythm, without ST elevations or depressions. Of note, the patient has been hypoglycemic.  He has been febrile up to 102.5 °F.  Abdomen is distended, mildly tender to palpation.  Chest x-ray without acute cardiopulmonary disease.  Patient's procalcitonin is negative at 0.08.  Given the patient's fever, extended respiratory viral panel ordered and resulted positive for adenovirus.      Vitals:    09/13/24 0317   BP: (!) 152/100   Pulse:    Resp:    Temp:    SpO2:       Plan:  # Adenovirus  -Supportive treatment  -Droplet/contact precautions  -As needed Tylenol for fever >100.5      -----------------------------------------------------------------------------------------------------------    Electronically signed by:  Nataliya Ledbetter, KATHRYN, MAXX, JUSTEN-BC  Hospitalist Services

## 2024-09-13 NOTE — CARE PLAN
The patient is Watcher - Medium risk of patient condition declining or worsening    Shift Goals  Clinical Goals: Pt will receive medication according to CIWA assessment Q4, pt will receive abx, vitals will be monitored  Patient Goals: Rest  Family Goals: ELISEO    Progress made toward(s) clinical / shift goals:    Problem: Knowledge Deficit - Standard  Goal: Patient and family/care givers will demonstrate understanding of plan of care, disease process/condition, diagnostic tests and medications this shift as seen by pt stating understanding  Outcome: Progressing  Note: Pt states understanding of low blood sugar and complies with drinking juice and eating snacks. Pt states understanding of medication to lower blood pressure and fever.        Patient is not progressing towards the following goals:      Problem: Physical Regulation  Goal: Signs and symptoms of infection will decrease this shift as not seen by high HR, lactic acid, and fatigue.   9/13/2024 0308 by Kady Soto R.N.  Outcome: Not Progressing  Note: Pt fever has broken with Toradol administration, however, pt HR remains high, his lactic acid is high, and patient reports fatigue.   9/13/2024 0306 by Kady Soto, R.N.  Outcome: Progressing

## 2024-09-13 NOTE — PROGRESS NOTES
Received report and assumed care of patient at change of shift. Patient is A&Ox4, on RA, and reports no pain at this time. Pt on droplet and contact precautions. Pt has NS running at 100. CIWA completed, no intervention required. Patient assessment completed, bed in lowest position, and call light and personal belongings are within reach. Patient expressed no further needs at this time. Plan for pt is to monitor fever, BP, and abnormal labs.    0858: Pt wants to leave A. Dr. Falk He notified.

## 2024-09-13 NOTE — DISCHARGE SUMMARY
AMA Discharge Summary    CHIEF COMPLAINT ON ADMISSION  Chief Complaint   Patient presents with    Fatigue     Pt BIBA after pt's spouse due to pt being lethargic. Pt slammed left leg in a car door 2 days ago causing an abrasion. Pt now has redness and swelling to L leg with tenderness. Pt complaining of headache, fever (101.6f) and tachycardic       Reason for Admission  EMS     Admission Date  9/11/2024    CODE STATUS  Prior    HPI & HOSPITAL COURSE  Dio Shaevr is a 54 y.o. male with history of hypertension, smoking, alcohol use with daily binge drink, amphetamine use, homelessness who presented 9/11/2024 with complaints of left calf swelling, redness that developed after abrasion sustained 2 days ago.  Additional complaints include dry cough, generalized weakness and bilateral flank pain, and diffuse headache and lethargy in the last 2 days. Temperature 101.6 F, heart rate 123, on room air. Patient undergone LP to rule out meningitis due to headache.  Total protein slightly elevated to 46, no pleocytosis.  PCR for meningitis/encephalitis negative. Blood gas does not elevated.  CT abdomen pelvis with contrast: No acute abnormalities.  CT head without contrast no acute abnormalities.  Chest x-ray: No acute abnormalities. Patient was started on ceftriaxone and vancomycin,.  MRSA screen negative. de-escalated to Unasyn.  Respiratory panel ordered given persistent fever, positive for Adenovirus.  Patient still has tachycardia, elevated BP.  Likely due to meth use and infection.  He is not medically cleared.  However, he decided left AMA despite extensive education regarding the risks of severe sepsis, withdrawal symptoms, including death.  He is AOx4 with capacity.  He left AMA.  I prescribed Augmentin; for him.     Therefore, he is discharged in guarded and stable condition against medcial advice.    LEFT AMA    Discharge Date  9/13/2024    FOLLOW UP ITEMS POST DISCHARGE      DISCHARGE DIAGNOSES  Principal  Problem:    Cellulitis of left lower extremity (POA: Yes)  Active Problems:    Sepsis (HCC) (POA: Yes)    Tobacco abuse (POA: Yes)    Methamphetamine abuse (HCC) (POA: Yes)    Headache (POA: Unknown)    Encephalopathy acute (POA: Unknown)  Resolved Problems:    * No resolved hospital problems. *      FOLLOW UP  No future appointments.  No follow-up provider specified.    MEDICATIONS ON DISCHARGE     Medication List        START taking these medications        Instructions   amoxicillin-clavulanate 875-125 MG Tabs  Commonly known as: Augmentin   Take 1 Tablet by mouth 2 times a day for 5 days.  Dose: 1 Tablet              Allergies  Allergies   Allergen Reactions    Bloodless     Tuna Oil [Fish Oil] Rash     Pt states tuna causes a rash.        DIET  No orders of the defined types were placed in this encounter.      ACTIVITY  As tolerated.  Weight bearing as tolerated    CONSULTATIONS      PROCEDURES      LABORATORY  Lab Results   Component Value Date    SODIUM 136 09/13/2024    POTASSIUM 3.3 (L) 09/13/2024    CHLORIDE 105 09/13/2024    CO2 20 09/13/2024    GLUCOSE 132 (H) 09/13/2024    BUN 10 09/13/2024    CREATININE 0.93 09/13/2024        Lab Results   Component Value Date    WBC 5.7 09/13/2024    HEMOGLOBIN 12.9 (L) 09/13/2024    HEMATOCRIT 36.6 (L) 09/13/2024    PLATELETCT 176 09/13/2024        Total time of the discharge process exceeds 35 minutes.

## 2024-09-13 NOTE — PROGRESS NOTES
Hypoglycemia Intervention    Hypoglycemia protocol intervention:  Blood glucose 48   at 2022.  Intervention: 8 oz of fruit juice   Repeat blood glucose 2049 at 66.  Intervention: 4 oz of fruit juice   Repeat blood glucose 2112 at 100.  Intervention: 4 oz of fruit juice/Snack   Additional interventions needed: Rechecked at 22:03 AT 58. Snacks/carbohydrates and more fruit juice provided. Continued monitoring every 15 minutes per protocol  Olga Benjamin notified of the above at 2136.

## 2024-09-13 NOTE — PROGRESS NOTES
RN educated pt about the risks associated with leaving the hospital and possible outcomes.  He educated pt about worsening symptoms and when to return. Pt stated he had all belongings and PIC Dc'd. Pt left unit AMA

## 2024-09-14 LAB
BACTERIA UR CULT: NORMAL
SIGNIFICANT IND 70042: NORMAL
SITE SITE: NORMAL
SOURCE SOURCE: NORMAL

## 2024-09-15 LAB
BACTERIA CSF CULT: NORMAL
GRAM STN SPEC: NORMAL
SIGNIFICANT IND 70042: NORMAL
SITE SITE: NORMAL
SOURCE SOURCE: NORMAL

## 2024-09-17 LAB
BACTERIA BLD CULT: NORMAL
BACTERIA BLD CULT: NORMAL
SIGNIFICANT IND 70042: NORMAL
SIGNIFICANT IND 70042: NORMAL
SITE SITE: NORMAL
SITE SITE: NORMAL
SOURCE SOURCE: NORMAL
SOURCE SOURCE: NORMAL

## (undated) DEVICE — BANDAGE ELASTIC 4 HONEYCOMB - 4"X5YD LF (20/CA)"

## (undated) DEVICE — LACTATED RINGERS INJ 1000 ML - (14EA/CA 60CA/PF)

## (undated) DEVICE — HEAD HOLDER JUNIOR/ADULT

## (undated) DEVICE — SET EXTENSION WITH 2 PORTS (48EA/CA) ***PART #2C8610 IS A SUBSTITUTE*****

## (undated) DEVICE — TUBING CLEARLINK DUO-VENT - C-FLO (48EA/CA)

## (undated) DEVICE — SUCTION INSTRUMENT YANKAUER BULBOUS TIP W/O VENT (50EA/CA)

## (undated) DEVICE — DRAPE LOWER EXTREMETY - (6/CA)

## (undated) DEVICE — SET LEADWIRE 5 LEAD BEDSIDE DISPOSABLE ECG (1SET OF 5/EA)

## (undated) DEVICE — PROTECTOR ULNA NERVE - (36PR/CA)

## (undated) DEVICE — NEPTUNE 4 PORT MANIFOLD - (20/PK)

## (undated) DEVICE — PADDING CAST 4 IN STERILE - 4 X 4 YDS (24/CA)

## (undated) DEVICE — ELECTRODE 850 FOAM ADHESIVE - HYDROGEL RADIOTRNSPRNT (50/PK)

## (undated) DEVICE — KIT ROOM DECONTAMINATION

## (undated) DEVICE — GLOVE BIOGEL SZ 7.5 SURGICAL PF LTX - (50PR/BX 4BX/CA)

## (undated) DEVICE — ELECTRODE DUAL RETURN W/ CORD - (50/PK)

## (undated) DEVICE — GOWN WARMING STANDARD FLEX - (30/CA)

## (undated) DEVICE — PACK MAJOR ORTHO - (2EA/CA)

## (undated) DEVICE — BANDAGE ELASTIC 6 HONEYCOMB - 6X5YD LF (20/CA)"

## (undated) DEVICE — PADDING CAST 6 IN STERILE - 6 X 4 YDS (24/CA)

## (undated) DEVICE — SODIUM CHL IRRIGATION 0.9% 1000ML (12EA/CA)

## (undated) DEVICE — SENSOR SPO2 NEO LNCS ADHESIVE (20/BX) SEE USER NOTES

## (undated) DEVICE — GLOVE BIOGEL INDICATOR SZ 8 SURGICAL PF LTX - (50/BX 4BX/CA)

## (undated) DEVICE — WATER IRRIG. STER. 1500 ML - (9/CA)

## (undated) DEVICE — KIT ANESTHESIA W/CIRCUIT & 3/LT BAG W/FILTER (20EA/CA)

## (undated) DEVICE — CANISTER SUCTION 3000ML MECHANICAL FILTER AUTO SHUTOFF MEDI-VAC NONSTERILE LF DISP  (40EA/CA)

## (undated) DEVICE — BLADE SURGICAL #15 - (50/BX 3BX/CA)

## (undated) DEVICE — SLEEVE, VASO, THIGH, MED

## (undated) DEVICE — MASK ANESTHESIA ADULT  - (100/CA)

## (undated) DEVICE — SUTURE 0 VICRYL PLUS CT-1 - 8 X 18 INCH (12/BX)

## (undated) DEVICE — SUTURE GENERAL

## (undated) DEVICE — SUTURE 2-0 VICRYL PLUS CT-1 - 8 X 18 INCH(12/BX)

## (undated) DEVICE — CHLORAPREP 26 ML APPLICATOR - ORANGE TINT(25/CA)